# Patient Record
Sex: MALE | Race: BLACK OR AFRICAN AMERICAN | NOT HISPANIC OR LATINO | Employment: FULL TIME | URBAN - METROPOLITAN AREA
[De-identification: names, ages, dates, MRNs, and addresses within clinical notes are randomized per-mention and may not be internally consistent; named-entity substitution may affect disease eponyms.]

---

## 2017-07-11 ENCOUNTER — OFFICE VISIT (OUTPATIENT)
Dept: FAMILY MEDICINE | Facility: CLINIC | Age: 37
End: 2017-07-11
Payer: COMMERCIAL

## 2017-07-11 VITALS
WEIGHT: 179 LBS | SYSTOLIC BLOOD PRESSURE: 142 MMHG | BODY MASS INDEX: 28.09 KG/M2 | HEIGHT: 67 IN | HEART RATE: 68 BPM | TEMPERATURE: 98 F | DIASTOLIC BLOOD PRESSURE: 98 MMHG

## 2017-07-11 DIAGNOSIS — I10 BENIGN ESSENTIAL HYPERTENSION: Primary | ICD-10-CM

## 2017-07-11 DIAGNOSIS — R51.9 NONINTRACTABLE EPISODIC HEADACHE, UNSPECIFIED HEADACHE TYPE: ICD-10-CM

## 2017-07-11 PROCEDURE — 99214 OFFICE O/P EST MOD 30 MIN: CPT | Performed by: PHYSICIAN ASSISTANT

## 2017-07-11 RX ORDER — AMLODIPINE BESYLATE 5 MG/1
5 TABLET ORAL DAILY
Qty: 30 TABLET | Refills: 1 | Status: SHIPPED | OUTPATIENT
Start: 2017-07-11 | End: 2017-11-17

## 2017-07-11 ASSESSMENT — ENCOUNTER SYMPTOMS
SHORTNESS OF BREATH: 0
NAUSEA: 0
ABDOMINAL PAIN: 0
FOCAL WEAKNESS: 0
DIARRHEA: 0
FEVER: 0
VOMITING: 0
HEADACHES: 1
CHILLS: 0

## 2017-07-11 NOTE — Clinical Note
He is a candidate for PGen study. We should be offering this to all new diagnosis of hypertension (they don't get meds at the initial visit).  Do you have information on this? Darcie

## 2017-07-11 NOTE — MR AVS SNAPSHOT
After Visit Summary   7/11/2017    Ephraim Rebolledo    MRN: [id number]           Patient Information     Date Of Birth          1980        Visit Information        Provider Department      7/11/2017 11:40 AM Fanny Roberto PA-C [facility] [place]        Today's Diagnoses     Benign essential hypertension    -  1      Care Instructions    [facility]   Discharged by : Fidelia KUMARI MA    If you have any questions regarding your visit please contact your care team:     [facility] Clinic Hours Telephone Number   [address] Dr., Dr.   7am-7pm Monday - Thursday   7am-5pm Fridays  [phone]   (Appointment scheduling available 24/7)   RN Line   [phone] option 2       For a Price Quote for your services, please call our Consumer Price Line at [phone].     What options do I have for visits at the clinic other than the traditional office visit?     To expand how we care for you, many of our providers are utilizing electronic visits (e-visits) and telephone visits, when medically appropriate, for interactions with their patients rather than a visit in the clinic. We also offer nurse visits for many medical concerns. Just like any other service, we will bill your insurance company for this type of visit based on time spent on the phone with your provider. Not all insurance companies cover these visits. Please check with your medical insurance if this type of visit is covered. You will be responsible for any charges that are not paid by your insurance.   E-visits via Open-Plug: generally incur a $35.00 fee.     Telephone visits:   Time spent on the phone: *charged based on time that is spent on the phone in increments of 10 minutes. Estimated cost:   5-10 mins $30.00   11-20 mins. $59.00   21-30 mins. $85.00     Use Open-Plug (secure email communication and access to your chart) to  send your primary care provider a message or make an appointment. Ask someone on your Team how to sign up for PhoneGuard.     As always, Thank you for trusting us with your health care needs!      [facility]:    Scheduling Appointments  [place]  Call: [phone]    [place]  Call: [phone]    [facility]  Call: [phone]      WHERE TO GO FOR CARE?    Clinic    Make an appointment if you:       Are sick (cold, cough, flu, sore throat, earache or in pain).       Have a small injury (sprain, small cut, burn or broken bone).       Need a physical exam, Pap smear, vaccine or prescription refill.       Have questions about your health or medicines.    To reach us:      Call [phone] ([phone]). Open 24 hours every day. (For counseling services, call [phone].)    Log into PhoneGuard at [email or web address]. (Visit [email or web address] to create an account.) Hospital emergency room    An emergency is a serious or life- threatening problem that must be treated right away.    Call [phone] or get to the hospital if you have:      Very bad or sudden:            - Chest pain or pressure         - Bleeding         - Head or belly pain         - Dizziness or trouble seeing, walking or                          Speaking      Problems breathing      Blood in your vomit or you are coughing up blood      A major injury (knocked out, loss of a finger or limb, rape, broken bone protruding from skin)    A mental health crisis. (Or call the Mental Health Crisis line at 1-856.503.1402 or Suicide Prevention Hotline at 1-486.746.1842.)    Open 24 hours every day. You don't need an appointment.     Urgent care    Visit urgent care for sickness or small injuries when the clinic is closed. You don't need an appointment. To check hours or find an urgent care near you, visit [email or web address]. Online care    Get online care from  "[facility] for more than 70 common problems, like colds, allergies and infections. Open 24 hours every day at:   [email or web address]   Need help deciding?    For advice about where to be seen, you may call your clinic and ask to speak with a nurse. We're here for you 24 hours every day.         If you are deaf or hard of hearing, please let us know. We provide many free services including sign language interpreters, oral interpreters, TTYs, telephone amplifiers, note takers and written materials.                         Follow-ups after your visit        Who to contact     If you have questions or need follow up information about today's clinic visit or your schedule please contact [facility] directly at [phone].  Normal or non-critical lab and imaging results will be communicated to you by Miso Mediahart, letter or phone within 4 business days after the clinic has received the results. If you do not hear from us within 7 days, please contact the clinic through Miso Mediahart or phone. If you have a critical or abnormal lab result, we will notify you by phone as soon as possible.  Submit refill requests through Continuity Software or call your pharmacy and they will forward the refill request to us. Please allow 3 business days for your refill to be completed.          Additional Information About Your Visit        Continuity Software Information     Continuity Software lets you send messages to your doctor, view your test results, renew your prescriptions, schedule appointments and more. To sign up, go to [email or web address] . Click on \"Log in\" on the left side of the screen, which will take you to the Welcome page. Then click on \"Sign up Now\" on the right side of the page.     You will be asked to enter the access code listed below, as well as some personal information. Please follow the directions to create your username and password.     Your access code is: [email or web address]  Expires: 10/9/2017 11:59 AM     Your access code will  in 90 " "days. If you need help or a new code, please call your [facility] clinic or [phone].        Care EveryWhere ID     This is your Care EveryWhere ID. This could be used by other organizations to access your [facility] medical records  [id number]        Your Vitals Were     Pulse Temperature Height BMI (Body Mass Index)          68 98  F (36.7  C) (Oral) 5' 7\" (1.702 m) 28.04 kg/m2         Blood Pressure from Last 3 Encounters:   07/11/17 (!) 142/98   11/14/14 (!) 140/95   09/19/14 (!) 143/94    Weight from Last 3 Encounters:   07/11/17 179 lb (81.2 kg)   11/14/14 176 lb (79.8 kg)   09/19/14 172 lb (78 kg)              Today, you had the following     No orders found for display         Today's Medication Changes          These changes are accurate as of: 7/11/17 12:00 PM.  If you have any questions, ask your nurse or doctor.               Start taking these medicines.        Dose/Directions    amLODIPine 5 MG tablet   Commonly known as:  NORVASC   Used for:  Benign essential hypertension   Started by:  Fanny Roberto PA-C        Dose:  5 mg   Take 1 tablet (5 mg) by mouth daily   Quantity:  30 tablet   Refills:  1         Stop taking these medicines if you haven't already. Please contact your care team if you have questions.     isometheptene-dichloralphenazone-acetaminophen -325 MG per capsule   Commonly known as:  MIDRIN   Stopped by:  Fanny Roberto PA-C                Where to get your medicines      These medications were sent to [facility] [address]  [address] [facility], [facility] [address]     Phone:  [phone]     amLODIPine 5 MG tablet                Primary Care Provider Office Phone #    [facility] [phone]       No address on file        Equal Access to Services     ANNABELLA BURNHAM AH: Nikki manno Soomaali, waaxda luqadaha, qaybta kaalmada adeegyatamera, elpidio sky. " So [facility] [phone].    ATENCIÓN: Si habla charito, tiene a medina disposición servicios gratuitos de asistencia lingüística. Randall al [phone].    We comply with applicable federal civil rights laws and [place] laws. We do not discriminate on the basis of race, color, national origin, age, disability sex, sexual orientation or gender identity.            Thank you!     Thank you for choosing [facility]  for your care. Our goal is always to provide you with excellent care. Hearing back from our patients is one way we can continue to improve our services. Please take a few minutes to complete the written survey that you may receive in the mail after your visit with us. Thank you!             Your Updated Medication List - Protect others around you: Learn how to safely use, store and throw away your medicines at www.disposemymeds.org.          This list is accurate as of: 7/11/17 12:00 PM.  Always use your most recent med list.                   Brand Name Dispense Instructions for use Diagnosis    amLODIPine 5 MG tablet    NORVASC    30 tablet    Take 1 tablet (5 mg) by mouth daily    Benign essential hypertension       VITAMIN C PO

## 2017-07-11 NOTE — PROGRESS NOTES
HPI      SUBJECTIVE:                                                    Ephraim Rebolledo is a 36 year old male who presents to clinic today for the following health issues:    Over 2 years ago pt was diagnosed with hypertension. He chose to try lifestyle changes and was told to f/u in 1 month for recheck which he never did. Pt has been checking his BP at home and it has remained high. Most recently he checked it about 1 month ago and it was 141/91. Pt has been trying to reduce sodium and junk food. Has also been running on a treadmill once per week. Denies CP, SOB, leg swelling, or vision changes. No cardiac history in his family. No hx tobacco use.    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    He reports intermittent L sided HA which is described a throbbing for past few years as well. 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 It is improved by sleeping. Also notes photophobia at times. Denies vision changes, neck pain, focal weakness, dizziness, numbness/tingling, or any other symptoms.      Chart Review:  History   Smoking Status     Never Smoker   Smokeless Tobacco     Never Used     No flowsheet data found.  No flowsheet data found.    Patient Active Problem List   Diagnosis     CARDIOVASCULAR SCREENING; LDL GOAL LESS THAN 160     Vitamin D deficiency     Thrombophlebitis of arm, right     HTN, goal below 140/90     Overweight (BMI 25.0-29.9)     Past Surgical History:   Procedure Laterality Date     NO HISTORY OF SURGERY       Problem list, Medication list, Allergies, Medical/Social/Surg hx reviewed in [facility], updated as appropriate.      Review of Systems   Constitutional: Negative for chills and fever.        High BP   Respiratory: Negative for shortness of breath.    Cardiovascular: Negative for chest pain.   Gastrointestinal: Negative for abdominal pain, diarrhea, nausea and vomiting.   Skin: Negative for rash.   Neurological:  "Positive for headaches. Negative for focal weakness.   All other systems reviewed and are negative.        Physical Exam   Constitutional: He is oriented to person, place, and time and well-developed, well-nourished, and in no distress.   HENT:   Head: Normocephalic and atraumatic.   Eyes: EOM are normal. Pupils are equal, round, and reactive to light.   Cardiovascular: Normal rate, regular rhythm and normal heart sounds.    Pulmonary/Chest: Effort normal and breath sounds normal.   Musculoskeletal: Normal range of motion.   Neurological: He is alert and oriented to person, place, and time. He has normal motor skills, normal sensation and normal strength. He has a normal Cerebellar Exam. He shows no pronator drift. Gait normal.   Skin: Skin is warm and dry.   Nursing note and vitals reviewed.      Vital Signs  BP (!) 142/98  Pulse 68  Temp 98  F (36.7  C) (Oral)  Ht 5' 7\" (1.702 m)  Wt 179 lb (81.2 kg)  BMI 28.04 kg/m2   Body mass index is 28.04 kg/(m^2).    Diagnostic Test Results:  none     ASSESSMENT/PLAN:                                                        ICD-10-CM    1. Benign essential hypertension I10 amLODIPine (NORVASC) 5 MG tablet   2. Nonintractable episodic headache, unspecified headache type R51      /98 today. Pt agrees to start medication. Will start on amlodipine in this  patient. F/u in 2 weeks for nurse appt for BP recheck and to monitor HR. Make appt for physical.    Unremarkable neuro exam. HA may be related to hypertension. If HA persists once BP well controlled, consider neurology referral. May be migraine HAs.    I have discussed any lab or imaging results, the patient's diagnosis, and my plan of treatment with the patient and/or family. Patient is aware to come back in if with worsening symptoms or if no relief despite treatment plan.  Patient voiced understanding and had no further questions.       Follow Up: Return in about 2 weeks (around 7/25/2017) for " Routine Visit, BP Recheck.    DOMINIQUE Lee, PA-C  [facility]

## 2017-07-11 NOTE — PATIENT INSTRUCTIONS
[facility]   Discharged by : Fidelia KUMARI MA    If you have any questions regarding your visit please contact your care team:     [facility] Clinic Hours Telephone Number   [address] Dr., Dr.   7am-7pm Monday - Thursday   7am-5pm Fridays  [phone]   (Appointment scheduling available 24/7)   RN Line   [phone] option 2       For a Price Quote for your services, please call our Consumer Price Line at [phone].     What options do I have for visits at the clinic other than the traditional office visit?     To expand how we care for you, many of our providers are utilizing electronic visits (e-visits) and telephone visits, when medically appropriate, for interactions with their patients rather than a visit in the clinic. We also offer nurse visits for many medical concerns. Just like any other service, we will bill your insurance company for this type of visit based on time spent on the phone with your provider. Not all insurance companies cover these visits. Please check with your medical insurance if this type of visit is covered. You will be responsible for any charges that are not paid by your insurance.   E-visits via NoWait: generally incur a $35.00 fee.     Telephone visits:   Time spent on the phone: *charged based on time that is spent on the phone in increments of 10 minutes. Estimated cost:   5-10 mins $30.00   11-20 mins. $59.00   21-30 mins. $85.00     Use Agile Healthhart (secure email communication and access to your chart) to send your primary care provider a message or make an appointment. Ask someone on your Team how to sign up for NoWait.     As always, Thank you for trusting us with your health care needs!      [facility]:    Scheduling Appointments  [facility] [place]  Call: [phone]    [facility]  Call: [phone]    [facility]  [facility]  Call: [phone]      WHERE TO GO FOR CARE?    Clinic    Make an appointment if you:       Are sick (cold, cough, flu, sore throat, earache or in pain).       Have a small injury (sprain, small cut, burn or broken bone).       Need a physical exam, Pap smear, vaccine or prescription refill.       Have questions about your health or medicines.    To reach us:      Call [phone] ([phone]). Open 24 hours every day. (For counseling services, call [phone].)    Log into [employer]iences at Loggly. (Visit Vuzit to create an account.) Hospital emergency room    An emergency is a serious or life- threatening problem that must be treated right away.    Call [phone] or get to the hospital if you have:      Very bad or sudden:            - Chest pain or pressure         - Bleeding         - Head or belly pain         - Dizziness or trouble seeing, walking or                          Speaking      Problems breathing      Blood in your vomit or you are coughing up blood      A major injury (knocked out, loss of a finger or limb, rape, broken bone protruding from skin)    A mental health crisis. (Or call the Mental Health Crisis line at 1-316.683.4927 or Suicide Prevention Hotline at 1-922.242.4644.)    Open 24 hours every day. You don't need an appointment.     Urgent care    Visit urgent care for sickness or small injuries when the clinic is closed. You don't need an appointment. To check hours or find an urgent care near you, visit [email or web address]. Online care    Get online care from [facility] for more than 70 common problems, like colds, allergies and infections. Open 24 hours every day at:   [email or web address]   Need help deciding?    For advice about where to be seen, you may call your clinic and ask to speak with a nurse. We're here for you 24 hours every day.         If you are deaf or hard of hearing, please let us know. We provide many free services including  sign language interpreters, oral interpreters, TTYs, telephone amplifiers, note takers and written materials.

## 2017-07-26 NOTE — PROGRESS NOTES
Chart reviewed.  Encounter was reviewed with provider.  Patient was not examined by me.  Jazmine Pham MD

## 2017-09-06 ENCOUNTER — TELEPHONE (OUTPATIENT)
Dept: FAMILY MEDICINE | Facility: CLINIC | Age: 37
End: 2017-09-06

## 2017-09-06 NOTE — TELEPHONE ENCOUNTER
Panel Management Review      BP Readings from Last 1 Encounters:   07/11/17 (!) 142/98        Fail List measure: BLOOD PRESURE      Patient is due/failing the following:   BP CHECK    Action needed:   Patient needs office visit for blood pressure.    Type of outreach:    Phone, spoke to patient.  He states that the provider told him to check it and he did at the pharmacy and it is fine. He will calll back to make his physical.    Questions for provider review:    None                                                                                                                                    Kevin Reina      Chart routed to Care Team .

## 2017-11-17 DIAGNOSIS — I10 BENIGN ESSENTIAL HYPERTENSION: ICD-10-CM

## 2017-11-17 RX ORDER — AMLODIPINE BESYLATE 5 MG/1
5 TABLET ORAL DAILY
Qty: 90 TABLET | Refills: 0 | Status: SHIPPED | OUTPATIENT
Start: 2017-11-17 | End: 2018-08-30

## 2017-11-17 NOTE — TELEPHONE ENCOUNTER
amLODIPine (NORVASC) 5 MG tablet      Last Written Prescription Date:  7/11/17  Last Fill Quantity: 30,   # refills: 1  Future Office visit:       Potassium   Date Value Ref Range Status   09/16/2014 3.8 3.4 - 5.3 mmol/L Final     Creatinine   Date Value Ref Range Status   09/16/2014 1.10 0.66 - 1.25 mg/dL Final     BP Readings from Last 3 Encounters:   07/11/17 (!) 142/98   11/14/14 (!) 140/95   09/19/14 (!) 143/94     PCP aware of BP's.     Thanks! Isabel Seay RN

## 2017-11-17 NOTE — TELEPHONE ENCOUNTER
amLODIPine (NORVASC) 5 MG tablet      Last Written Prescription Date:  7/11/17  Last Fill Quantity: 30,   # refills: 1  Future Office visit:       Routing refill request to provider for review/approval because:  Does not meet protocol criteria  LOV: 7/11/17

## 2018-08-30 ENCOUNTER — OFFICE VISIT (OUTPATIENT)
Dept: FAMILY MEDICINE | Facility: CLINIC | Age: 38
End: 2018-08-30
Payer: COMMERCIAL

## 2018-08-30 VITALS
SYSTOLIC BLOOD PRESSURE: 148 MMHG | DIASTOLIC BLOOD PRESSURE: 90 MMHG | HEART RATE: 71 BPM | RESPIRATION RATE: 20 BRPM | WEIGHT: 184 LBS | TEMPERATURE: 97.9 F | OXYGEN SATURATION: 98 % | HEIGHT: 67 IN | BODY MASS INDEX: 28.88 KG/M2

## 2018-08-30 DIAGNOSIS — I10 BENIGN ESSENTIAL HYPERTENSION: ICD-10-CM

## 2018-08-30 DIAGNOSIS — Z23 NEED FOR IMMUNIZATION AGAINST INFLUENZA: ICD-10-CM

## 2018-08-30 DIAGNOSIS — Z00.01 ENCOUNTER FOR ROUTINE ADULT HEALTH EXAMINATION WITH ABNORMAL FINDINGS: Primary | ICD-10-CM

## 2018-08-30 DIAGNOSIS — Z23 NEED FOR PROPHYLACTIC VACCINATION AND INOCULATION AGAINST INFLUENZA: ICD-10-CM

## 2018-08-30 DIAGNOSIS — Z23 NEED FOR DIPHTHERIA-TETANUS-PERTUSSIS (TDAP) VACCINE, ADULT/ADOLESCENT: ICD-10-CM

## 2018-08-30 LAB
ALBUMIN SERPL-MCNC: 3.6 G/DL (ref 3.4–5)
ALP SERPL-CCNC: 79 U/L (ref 40–150)
ALT SERPL W P-5'-P-CCNC: 28 U/L (ref 0–70)
ANION GAP SERPL CALCULATED.3IONS-SCNC: 6 MMOL/L (ref 3–14)
AST SERPL W P-5'-P-CCNC: 29 U/L (ref 0–45)
BILIRUB SERPL-MCNC: 0.3 MG/DL (ref 0.2–1.3)
BUN SERPL-MCNC: 11 MG/DL (ref 7–30)
CALCIUM SERPL-MCNC: 8.8 MG/DL (ref 8.5–10.1)
CHLORIDE SERPL-SCNC: 104 MMOL/L (ref 94–109)
CHOLEST SERPL-MCNC: 137 MG/DL
CO2 SERPL-SCNC: 31 MMOL/L (ref 20–32)
CREAT SERPL-MCNC: 1.08 MG/DL (ref 0.66–1.25)
CREAT UR-MCNC: 374 MG/DL
GFR SERPL CREATININE-BSD FRML MDRD: 76 ML/MIN/1.7M2
GLUCOSE SERPL-MCNC: 82 MG/DL (ref 70–99)
HBA1C MFR BLD: 5.6 % (ref 0–5.6)
HDLC SERPL-MCNC: 54 MG/DL
LDLC SERPL CALC-MCNC: 70 MG/DL
MICROALBUMIN UR-MCNC: 14 MG/L
MICROALBUMIN/CREAT UR: 3.8 MG/G CR (ref 0–17)
NONHDLC SERPL-MCNC: 83 MG/DL
POTASSIUM SERPL-SCNC: 3.5 MMOL/L (ref 3.4–5.3)
PROT SERPL-MCNC: 7.3 G/DL (ref 6.8–8.8)
SODIUM SERPL-SCNC: 141 MMOL/L (ref 133–144)
TRIGL SERPL-MCNC: 66 MG/DL
TSH SERPL DL<=0.005 MIU/L-ACNC: 0.61 MU/L (ref 0.4–4)

## 2018-08-30 PROCEDURE — 80061 LIPID PANEL: CPT | Performed by: NURSE PRACTITIONER

## 2018-08-30 PROCEDURE — 90715 TDAP VACCINE 7 YRS/> IM: CPT | Performed by: NURSE PRACTITIONER

## 2018-08-30 PROCEDURE — 83036 HEMOGLOBIN GLYCOSYLATED A1C: CPT | Performed by: NURSE PRACTITIONER

## 2018-08-30 PROCEDURE — 90686 IIV4 VACC NO PRSV 0.5 ML IM: CPT | Performed by: NURSE PRACTITIONER

## 2018-08-30 PROCEDURE — 99395 PREV VISIT EST AGE 18-39: CPT | Mod: 25 | Performed by: NURSE PRACTITIONER

## 2018-08-30 PROCEDURE — 90471 IMMUNIZATION ADMIN: CPT | Performed by: NURSE PRACTITIONER

## 2018-08-30 PROCEDURE — 36415 COLL VENOUS BLD VENIPUNCTURE: CPT | Performed by: NURSE PRACTITIONER

## 2018-08-30 PROCEDURE — 82043 UR ALBUMIN QUANTITATIVE: CPT | Performed by: NURSE PRACTITIONER

## 2018-08-30 PROCEDURE — 99213 OFFICE O/P EST LOW 20 MIN: CPT | Mod: 25 | Performed by: NURSE PRACTITIONER

## 2018-08-30 PROCEDURE — 84443 ASSAY THYROID STIM HORMONE: CPT | Performed by: NURSE PRACTITIONER

## 2018-08-30 PROCEDURE — 80053 COMPREHEN METABOLIC PANEL: CPT | Performed by: NURSE PRACTITIONER

## 2018-08-30 PROCEDURE — 90472 IMMUNIZATION ADMIN EACH ADD: CPT | Performed by: NURSE PRACTITIONER

## 2018-08-30 RX ORDER — AMLODIPINE BESYLATE 5 MG/1
5 TABLET ORAL DAILY
Qty: 90 TABLET | Refills: 3 | Status: SHIPPED | OUTPATIENT
Start: 2018-08-30 | End: 2019-02-26

## 2018-08-30 ASSESSMENT — PAIN SCALES - GENERAL: PAINLEVEL: NO PAIN (0)

## 2018-08-30 NOTE — PROGRESS NOTES
SUBJECTIVE:   CC: Ephraim Rebolledo is an 37 year old male who presents for preventative health visit.     Healthy Habits:    Do you get at least three servings of calcium containing foods daily (dairy, green leafy vegetables, etc.)? yes    Amount of exercise or daily activities, outside of work: 3-4 day(s) per week    Problems taking medications regularly No    Medication side effects: No    Have you had an eye exam in the past two years? yes    Do you see a dentist twice per year? no    Do you have sleep apnea, excessive snoring or daytime drowsiness?no       Hypertension:  Again stopped amlodipine and tried lifestyle changes- exercise and diet.  But would like to restart Amlodipine now.    Today's PHQ-2 Score:   PHQ-2 ( 1999 Pfizer) 8/30/2018 9/16/2014   Q1: Little interest or pleasure in doing things 0 0   Q2: Feeling down, depressed or hopeless 0 0   PHQ-2 Score 0 0       Abuse: Current or Past(Physical, Sexual or Emotional)- No  Do you feel safe in your environment - Yes    Social History   Substance Use Topics     Smoking status: Never Smoker     Smokeless tobacco: Never Used     Alcohol use Yes      Comment: occasionally, couple times a year      If you drink alcohol do you typically have >3 drinks per day or >7 drinks per week? Not Applicable                      Last PSA: No results found for: PSA    Reviewed orders with patient. Reviewed health maintenance and updated orders accordingly - Yes  BP Readings from Last 3 Encounters:   08/30/18 148/90   07/11/17 (!) 142/98   11/14/14 (!) 140/95    Wt Readings from Last 3 Encounters:   08/30/18 184 lb (83.5 kg)   07/11/17 179 lb (81.2 kg)   11/14/14 176 lb (79.8 kg)                  Patient Active Problem List   Diagnosis     CARDIOVASCULAR SCREENING; LDL GOAL LESS THAN 160     Vitamin D deficiency     Thrombophlebitis of arm, right     HTN, goal below 140/90     Overweight (BMI 25.0-29.9)     Past Surgical History:   Procedure Laterality Date     NO HISTORY  OF SURGERY         Social History   Substance Use Topics     Smoking status: Never Smoker     Smokeless tobacco: Never Used     Alcohol use Yes      Comment: occasionally, couple times a year     Family History   Problem Relation Age of Onset     Hypertension Mother      HEART DISEASE Brother      cardiomegaly         Current Outpatient Prescriptions   Medication Sig Dispense Refill     amLODIPine (NORVASC) 5 MG tablet Take 1 tablet (5 mg) by mouth daily 90 tablet 3     Ascorbic Acid (VITAMIN C PO)        [DISCONTINUED] amLODIPine (NORVASC) 5 MG tablet Take 1 tablet (5 mg) by mouth daily (Patient not taking: Reported on 8/30/2018) 90 tablet 0     No Known Allergies  Recent Labs   Lab Test  09/16/14   1636  01/09/12   1135   LDL   --   94   HDL   --   55   TRIG   --   62   ALT  24   --    CR  1.10  0.89   GFRESTIMATED  76  >90   GFRESTBLACK  >90   GFR Calc    >90   POTASSIUM  3.8  3.8        Reviewed and updated as needed this visit by clinical staff  Tobacco  Allergies  Meds  Med Hx  Surg Hx  Fam Hx  Soc Hx        Reviewed and updated as needed this visit by Provider  Tobacco  Allergies  Meds  Med Hx  Surg Hx  Fam Hx  Soc Hx       Past Medical History:   Diagnosis Date     Measles as Child      Past Surgical History:   Procedure Laterality Date     NO HISTORY OF SURGERY         ROS:  CONSTITUTIONAL: NEGATIVE for fever, chills, change in weight  INTEGUMENTARY/SKIN: NEGATIVE for worrisome rashes, moles or lesions  EYES: NEGATIVE for vision changes or irritation  ENT: NEGATIVE for ear, mouth and throat problems  RESP: NEGATIVE for significant cough or SOB  CV: NEGATIVE for chest pain, palpitations or peripheral edema  GI: NEGATIVE for nausea, abdominal pain, heartburn, or change in bowel habits   male: negative for dysuria, hematuria, decreased urinary stream, erectile dysfunction, urethral discharge  MUSCULOSKELETAL: NEGATIVE for significant arthralgias or myalgia  NEURO: NEGATIVE for  "weakness, dizziness or paresthesias  PSYCHIATRIC: NEGATIVE for changes in mood or affect    OBJECTIVE:   /90 (BP Location: Right arm, Patient Position: Chair, Cuff Size: Adult Regular)  Pulse 71  Temp 97.9  F (36.6  C) (Oral)  Resp 20  Ht 5' 6.5\" (1.689 m)  Wt 184 lb (83.5 kg)  SpO2 98%  BMI 29.25 kg/m2  EXAM:  GENERAL: healthy, alert and no distress  EYES: Eyes grossly normal to inspection, PERRL and conjunctivae and sclerae normal  HENT: ear canals and TM's normal, nose and mouth without ulcers or lesions  NECK: no adenopathy, no asymmetry, masses, or scars and thyroid normal to palpation  RESP: lungs clear to auscultation - no rales, rhonchi or wheezes  CV: regular rate and rhythm, normal S1 S2, no S3 or S4, no murmur, click or rub, no peripheral edema and peripheral pulses strong  ABDOMEN: soft, nontender, no hepatosplenomegaly, no masses and bowel sounds normal  MS: no gross musculoskeletal defects noted, no edema  SKIN: no suspicious lesions or rashes  NEURO: Normal strength and tone, mentation intact and speech normal  PSYCH: mentation appears normal, affect normal/bright    Diagnostic Test Results:  No results found for this or any previous visit (from the past 24 hour(s)).    ASSESSMENT/PLAN:   1. Encounter for routine adult health examination with abnormal findings  States with employee health at the [facility] he said he has had immunity testing for Hep B, varicella, and MMR.  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  - Comprehensive metabolic panel  - Hemoglobin A1c    2. Benign essential hypertension  Restarting medication today.    - Comprehensive metabolic panel  - Albumin Random Urine Quantitative with Creat Ratio  - Hemoglobin A1c  - amLODIPine (NORVASC) 5 MG tablet; Take 1 tablet (5 mg) by mouth daily  Dispense: 90 tablet; Refill: 3  - TSH with free T4 reflex  - Lipid panel reflex to direct LDL " "Non-fasting    3. Need for diphtheria-tetanus-pertussis (Tdap) vaccine, adult/adolescent  - TDAP, IM (10 - 64 YRS) - Adacel  - ADMIN 1st VACCINE    4. Need for immunization against influenza  - VACCINE ADMINISTRATION, EACH ADDITIONAL  - FLU VACCINE, SPLIT VIRUS, IM (QUADRIVALENT) [75154]- >3 YRS    COUNSELING:  Reviewed preventive health counseling, as reflected in patient instructions       Regular exercise       Healthy diet/nutrition       Vision screening       Immunizations    Vaccinated against: Influenza and TDAP         BP Readings from Last 1 Encounters:   08/30/18 148/90     Estimated body mass index is 29.25 kg/(m^2) as calculated from the following:    Height as of this encounter: 5' 6.5\" (1.689 m).    Weight as of this encounter: 184 lb (83.5 kg).      Weight management plan: Discussed healthy diet and exercise guidelines and patient will follow up in 6 months in clinic to re-evaluate.     reports that he has never smoked. He has never used smokeless tobacco.      Counseling Resources:  ATP IV Guidelines  Pooled Cohorts Equation Calculator  FRAX Risk Assessment  ICSI Preventive Guidelines  Dietary Guidelines for Americans, 2010  USDA's MyPlate  ASA Prophylaxis  Lung CA Screening    [facility]    Injectable Influenza Immunization Documentation    1.  Is the person to be vaccinated sick today?   No    2. Does the person to be vaccinated have an allergy to a component   of the vaccine?   No  Egg Allergy Algorithm Link    3. Has the person to be vaccinated ever had a serious reaction   to influenza vaccine in the past?   No    4. Has the person to be vaccinated ever had Guillain-Barré syndrome?   No    Form completed by Haley Scott MA 8/30/2018           "

## 2018-08-30 NOTE — MR AVS SNAPSHOT
After Visit Summary   8/30/2018    Ephraim Rebolledo    MRN: [id number]           Patient Information     Date Of Birth          1980        Visit Information        Provider Department      8/30/2018 1:20 PM Carmela Narayanan APRN CNP [facility]        Today's Diagnoses     Need for diphtheria-tetanus-pertussis (Tdap) vaccine, adult/adolescent    -  1    Benign essential hypertension        Need for immunization against influenza        Encounter for routine adult health examination with abnormal findings        Need for prophylactic vaccination and inoculation against influenza          Care Instructions      Preventive Health Recommendations  Male Ages 26 - 39    Yearly exam:             See your health care provider every year in order to  o   Review health changes.   o   Discuss preventive care.    o   Review your medicines if your doctor has prescribed any.    You should be tested each year for STDs (sexually transmitted diseases), if you re at risk.     After age 35, talk to your provider about cholesterol testing. If you are at risk for heart disease, have your cholesterol tested at least every 5 years.     If you are at risk for diabetes, you should have a diabetes test (fasting glucose).  Shots: Get a flu shot each year. Get a tetanus shot every 10 years.     Nutrition:    Eat at least 5 servings of fruits and vegetables daily.     Eat whole-grain bread, whole-wheat pasta and brown rice instead of white grains and rice.     Get adequate Calcium and Vitamin D.     Lifestyle    Exercise for at least 150 minutes a week (30 minutes a day, 5 days a week). This will help you control your weight and prevent disease.     Limit alcohol to one drink per day.     No smoking.     Wear sunscreen to prevent skin cancer.     See your dentist every six months for an exam and cleaning.     At [facility], we strive to deliver an exceptional experience to  you, every time we see you.  If you receive a survey in the mail, please send us back your thoughts. We really do value your feedback.    Based on your medical history, these are the current health maintenance/preventive care services that you are due for (some may have been done at this visit.)  Health Maintenance Due   Topic Date Due     PHQ-2 Q1 YR  11/25/1990     LIPID SCREEN Q5 YR MALE (SYSTEM ASSIGNED)  01/09/2017       Suggested websites for health information:  Www.CareParent.org : Up to date and easily searchable information on multiple topics.  Www.medlineplus.gov : medication info, interactive tutorials, watch real surgeries online  Www.familydoctor.org : good info from the Academy of Family Physicians  Www.cdc.gov : public health info, travel advisories, epidemics (H1N1)  Www.aap.org : children's health info, normal development, vaccinations  Www.health.[facility].[place].us : [place] dept of health, public health issues in [place], N1N1    Your care team:                            Family Medicine Internal Medicine   MD Clint Faith MD Shantel Branch-Fleming, MD Katya Georgiev PA-C Megan Hill, APRN CNP    Eric Lui MD Pediatrics   Denis Mace, PATalatC  Carmela Narayanan, CNP MD Abigail Nieves APRN CNP   MD Sia Tobar MD Deborah Mielke, MD Kim Thein, APRN CNP      Clinic hours: Monday - Thursday 7 am-7 pm; Fridays 7 am-5 pm.   Urgent care: Monday - Friday 11 am-9 pm; Saturday and Sunday 9 am-5 pm.  Pharmacy : Monday -Thursday 8 am-8 pm; Friday 8 am-6 pm; Saturday and Sunday 9 am-5 pm.     Clinic: [phone]   Pharmacy: [phone]              Follow-ups after your visit        Who to contact     If you have questions or need follow up information about today's clinic visit or your schedule please contact [facility] [place] directly at [phone].  Normal or non-critical lab and imaging results will be communicated to you by MyChart, letter or phone  "within 4 business days after the clinic has received the results. If you do not hear from us within 7 days, please contact the clinic through [employer] or phone. If you have a critical or abnormal lab result, we will notify you by phone as soon as possible.  Submit refill requests through [employer] or call your pharmacy and they will forward the refill request to us. Please allow 3 business days for your refill to be completed.          Additional Information About Your Visit        CliqSearchharSNAPP' Information     [employer] gives you secure access to your electronic health record. If you see a primary care provider, you can also send messages to your care team and make appointments. If you have questions, please call your primary care clinic.  If you do not have a primary care provider, please call [phone] and they will assist you.        Care EveryWhere ID     This is your Care EveryWhere ID. This could be used by other organizations to access your [place] medical records  [id number]        Your Vitals Were     Pulse Temperature Respirations Height Pulse Oximetry BMI (Body Mass Index)    71 97.9  F (36.6  C) (Oral) 20 5' 6.5\" (1.689 m) 98% 29.25 kg/m2       Blood Pressure from Last 3 Encounters:   08/30/18 148/90   07/11/17 (!) 142/98   11/14/14 (!) 140/95    Weight from Last 3 Encounters:   08/30/18 184 lb (83.5 kg)   07/11/17 179 lb (81.2 kg)   11/14/14 176 lb (79.8 kg)              We Performed the Following     ADMIN 1st VACCINE     Albumin Random Urine Quantitative with Creat Ratio     Comprehensive metabolic panel     FLU VACCINE, SPLIT VIRUS, IM (QUADRIVALENT) [38087]- >3 YRS     Hemoglobin A1c     Lipid panel reflex to direct LDL Fasting     TDAP, IM (10 - 64 YRS) - Adacel     TSH with free T4 reflex     VACCINE ADMINISTRATION, EACH ADDITIONAL          Where to get your medicines      These medications were [place] [facility] [place] [place] [address]  [address], [place] " [facility] [address]     Phone:  [phone]     amLODIPine 5 MG tablet          Primary Care Provider Office Phone # Fax #    [facility] [phone]       [address]  [facility] [address]        Equal Access to Services     ANNABELLA BURNHAM : Hadii aad ku hadasho Soomaali, waaxda luqadaha, qaybta kaalmada adeegyada, waxay idiin hayaan adeeg kharash la'baldevn . So [facility] [place] [phone].    ATENCIÓN: Si habla español, tiene a medina disposición servicios gratuitos de asistencia lingüística. Llame al [phone].    We comply with applicable federal civil rights laws and [place] laws. We do not discriminate on the basis of race, color, national origin, age, disability, sex, sexual orientation, or gender identity.            Thank you!     Thank you for choosing [facility]  for your care. Our goal is always to provide you with excellent care. Hearing back from our patients is one way we can continue to improve our services. Please take a few minutes to complete the written survey that you may receive in the mail after your visit with us. Thank you!             Your Updated Medication List - Protect others around you: Learn how to safely use, store and throw away your medicines at www.disposemymeds.org.          This list is accurate as of 8/30/18  2:40 PM.  Always use your most recent med list.                   Brand Name Dispense Instructions for use Diagnosis    amLODIPine 5 MG tablet    NORVASC    90 tablet    Take 1 tablet (5 mg) by mouth daily    Benign essential hypertension       VITAMIN C PO

## 2018-08-30 NOTE — PATIENT INSTRUCTIONS
Preventive Health Recommendations  Male Ages 26 - 39    Yearly exam:             See your health care provider every year in order to  o   Review health changes.   o   Discuss preventive care.    o   Review your medicines if your doctor has prescribed any.    You should be tested each year for STDs (sexually transmitted diseases), if you re at risk.     After age 35, talk to your provider about cholesterol testing. If you are at risk for heart disease, have your cholesterol tested at least every 5 years.     If you are at risk for diabetes, you should have a diabetes test (fasting glucose).  Shots: Get a flu shot each year. Get a tetanus shot every 10 years.     Nutrition:    Eat at least 5 servings of fruits and vegetables daily.     Eat whole-grain bread, whole-wheat pasta and brown rice instead of white grains and rice.     Get adequate Calcium and Vitamin D.     Lifestyle    Exercise for at least 150 minutes a week (30 minutes a day, 5 days a week). This will help you control your weight and prevent disease.     Limit alcohol to one drink per day.     No smoking.     Wear sunscreen to prevent skin cancer.     See your dentist every six months for an exam and cleaning.     At [facility], we strive to deliver an exceptional experience to you, every time we see you.  If you receive a survey in the mail, please send us back your thoughts. We really do value your feedback.    Based on your medical history, these are the current health maintenance/preventive care services that you are due for (some may have been done at this visit.)  Health Maintenance Due   Topic Date Due     PHQ-2 Q1 YR  11/25/1990     LIPID SCREEN Q5 YR MALE (SYSTEM ASSIGNED)  01/09/2017       Suggested websites for health information:  Www.Remitly.org : Up to date and easily searchable information on multiple topics.  Www.medlineplus.gov : medication info, interactive tutorials, watch real surgeries  online  Www.familydoctor.org : good info from the Academy of Family Physicians  Www.cdc.gov : public health info, travel advisories, epidemics (H1N1)  Www.aap.org : children's health info, normal development, vaccinations  Www.health.state[place]us : [place] dept of health, public health issues in [place], N1N1    Your care team:                            Family Medicine Internal Medicine   MD Clint Faith MD Shantel Branch-Fleming, MD Katya Georgiev PA-C Megan Hill, APRPETEY Lui MD Pediatrics   Denis Mace, KELLY Narayanan, MD Abigail Fernandez APRN CNP   MD Sia Tobar MD Deborah Mielke, MD Kim Thein, APRN [facility]      Clinic hours: Monday - Thursday 7 am-7 pm; Fridays 7 am-5 pm.   Urgent care: Monday - Friday 11 am-9 pm; Saturday and Sunday 9 am-5 pm.  Pharmacy : Monday -Thursday 8 am-8 pm; Friday 8 am-6 pm; Saturday and Sunday 9 am-5 pm.     Clinic: [phone]   Pharmacy: [phone]

## 2018-08-30 NOTE — NURSING NOTE
Screening Questionnaire for Adult Immunization    Are you sick today?   No   Do you have allergies to medications, food, a vaccine component or latex?   No   Have you ever had a serious reaction after receiving a vaccination?   No   Do you have a long-term health problem with heart disease, lung disease, asthma, kidney disease, metabolic disease (e.g. diabetes), anemia, or other blood disorder?   No   Do you have cancer, leukemia, HIV/AIDS, or any other immune system problem?   No   In the past 3 months, have you taken medications that affect  your immune system, such as prednisone, other steroids, or anticancer drugs; drugs for the treatment of rheumatoid arthritis, Crohn s disease, or psoriasis; or have you had radiation treatments?   No   Have you had a seizure, or a brain or other nervous system problem?   No   During the past year, have you received a transfusion of blood or blood     products, or been given immune (gamma) globulin or antiviral drug?   No   For women: Are you pregnant or is there a chance you could become        pregnant during the next month?   No   Have you received any vaccinations in the past 4 weeks?   No     Immunization questionnaire answers were all negative.        Per orders of JOCELINE MOSQUERA, injection of Tdap, Flu Vacc given by Haley Scott.   Screening performed by Haley Scott on 8/30/2018 at 3:35 PM.

## 2019-02-26 ENCOUNTER — OFFICE VISIT (OUTPATIENT)
Dept: FAMILY MEDICINE | Facility: CLINIC | Age: 39
End: 2019-02-26
Payer: COMMERCIAL

## 2019-02-26 VITALS
BODY MASS INDEX: 29.13 KG/M2 | TEMPERATURE: 97.8 F | HEART RATE: 72 BPM | SYSTOLIC BLOOD PRESSURE: 136 MMHG | HEIGHT: 67 IN | WEIGHT: 185.6 LBS | DIASTOLIC BLOOD PRESSURE: 80 MMHG

## 2019-02-26 DIAGNOSIS — R41.840 INATTENTION: ICD-10-CM

## 2019-02-26 DIAGNOSIS — G47.00 INSOMNIA, UNSPECIFIED TYPE: Primary | ICD-10-CM

## 2019-02-26 DIAGNOSIS — I10 BENIGN ESSENTIAL HYPERTENSION: ICD-10-CM

## 2019-02-26 PROCEDURE — 99214 OFFICE O/P EST MOD 30 MIN: CPT | Performed by: FAMILY MEDICINE

## 2019-02-26 RX ORDER — TRAZODONE HYDROCHLORIDE 50 MG/1
50-150 TABLET, FILM COATED ORAL AT BEDTIME
Qty: 90 TABLET | Refills: 1 | Status: SHIPPED | OUTPATIENT
Start: 2019-02-26 | End: 2019-04-24

## 2019-02-26 RX ORDER — AMLODIPINE BESYLATE 5 MG/1
5 TABLET ORAL DAILY
Qty: 90 TABLET | Refills: 3 | Status: SHIPPED | OUTPATIENT
Start: 2019-02-26 | End: 2020-03-05

## 2019-02-26 ASSESSMENT — ANXIETY QUESTIONNAIRES
3. WORRYING TOO MUCH ABOUT DIFFERENT THINGS: NEARLY EVERY DAY
2. NOT BEING ABLE TO STOP OR CONTROL WORRYING: NEARLY EVERY DAY
5. BEING SO RESTLESS THAT IT IS HARD TO SIT STILL: NOT AT ALL
GAD7 TOTAL SCORE: 13
IF YOU CHECKED OFF ANY PROBLEMS ON THIS QUESTIONNAIRE, HOW DIFFICULT HAVE THESE PROBLEMS MADE IT FOR YOU TO DO YOUR WORK, TAKE CARE OF THINGS AT HOME, OR GET ALONG WITH OTHER PEOPLE: SOMEWHAT DIFFICULT
1. FEELING NERVOUS, ANXIOUS, OR ON EDGE: SEVERAL DAYS
6. BECOMING EASILY ANNOYED OR IRRITABLE: NOT AT ALL
7. FEELING AFRAID AS IF SOMETHING AWFUL MIGHT HAPPEN: NEARLY EVERY DAY

## 2019-02-26 ASSESSMENT — PATIENT HEALTH QUESTIONNAIRE - PHQ9
5. POOR APPETITE OR OVEREATING: NEARLY EVERY DAY
SUM OF ALL RESPONSES TO PHQ QUESTIONS 1-9: 10

## 2019-02-26 ASSESSMENT — MIFFLIN-ST. JEOR: SCORE: 1702.57

## 2019-02-26 NOTE — PROGRESS NOTES
"  SUBJECTIVE:   Ephraim Rebolledo is a 38 year old male who presents to clinic today for the following health issues:    1) Concerns for ADD/ ADHD-- lack of focus, cannot concentrate-- mind is always racing.  He feels that he might have depression.  Symptoms have worsened over the past 6 months.  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  He has noticed that his grades have been suffering.  He has trouble focusing when he is reading.  His mind drifts a lot.  He has never had issues like this before in the past.      2) Insomnia  Onset: \"Years\"    Description:   Time to fall asleep (sleep latency): \"forever\"  Middle of night awakening:  YES  Early morning awakening:  YES    Progression of Symptoms:  worsening    Accompanying Signs & Symptoms:  Daytime sleepiness/napping: YES  Excessive snoring/apnea: no   Restless legs: no   Frequent urination: no   Chronic pain:  no     History:  Prior Insomnia: no    Precipitating factors:   New stressful situation: YES  Caffeine intake: YES  OTC decongestants: no   Any new medications: YES-  OTC Vitamin B3    Alleviating factors:  Self medicating (alcohol, etc.):  No    Patient notes that when he goes to bed \"millions of things go through his mind\".  He only sleeps for 2-3 hours at night.      Therapies Tried and outcome:  None    Hypertension Follow-up    Outpatient blood pressures are being checked at home.  Results are 119/120 over 80's.    Low Salt Diet: no added salt      Problem list and histories reviewed & adjusted, as indicated.  Additional history: as documented    Patient Active Problem List   Diagnosis     CARDIOVASCULAR SCREENING; LDL GOAL LESS THAN 160     Vitamin D deficiency     Thrombophlebitis of arm, right     HTN, goal below 140/90     Overweight (BMI 25.0-29.9)     Past Surgical History:   Procedure Laterality Date     NO HISTORY OF SURGERY         Social History     Tobacco Use     Smoking status: Never Smoker     Smokeless " "tobacco: Never Used   Substance Use Topics     Alcohol use: Yes     Comment: occasionally, couple times a year     Family History   Problem Relation Age of Onset     Hypertension Mother      Heart Disease Brother         cardiomegaly           Reviewed and updated as needed this visit by clinical staff  Tobacco  Allergies  Meds  Med Hx  Surg Hx  Fam Hx  Soc Hx      Reviewed and updated as needed this visit by Provider         ROS:  Constitutional, HEENT, cardiovascular, pulmonary, gi and gu systems are negative, except as otherwise noted.    OBJECTIVE:     /80 (BP Location: Right arm, Patient Position: Sitting, Cuff Size: Adult Large)   Pulse 72   Temp 97.8  F (36.6  C) (Oral)   Ht 1.689 m (5' 6.5\")   Wt 84.2 kg (185 lb 9.6 oz)   BMI 29.51 kg/m    Body mass index is 29.51 kg/m .  GENERAL: healthy, alert and no distress  RESP: lungs clear to auscultation - no rales, rhonchi or wheezes  CV: regular rates and rhythm, normal S1 S2, no S3 or S4 and no murmur, click or rub  PSYCH: mentation appears normal and affect normal/bright    ASSESSMENT/PLAN:     1. Inattention  2. Insomnia, unspecified type  - I suspect that all of his symptoms are 2/2 insomnia vs depression/anxiety vs both   - Will start trazodone at bedtime to help with insomnia  - Offered a mental health referral for ADHD testing, but discussed that I doubt he has ADHD  - He will let me know if he'd like to proceed with the ADHD testing later on   - traZODone (DESYREL) 50 MG tablet; Take 1-3 tablets ( mg) by mouth At Bedtime  Dispense: 90 tablet; Refill: 1    3. Benign essential hypertension  - Well controlled   - amLODIPine (NORVASC) 5 MG tablet; Take 1 tablet (5 mg) by mouth daily  Dispense: 90 tablet; Refill: 3    Follow up in 1-2 months     Radha Daniels,   [facility]    "

## 2019-02-27 ASSESSMENT — ANXIETY QUESTIONNAIRES: GAD7 TOTAL SCORE: 13

## 2019-04-24 ENCOUNTER — HOSPITAL ENCOUNTER (EMERGENCY)
Facility: CLINIC | Age: 39
Discharge: HOME | End: 2019-04-24
Attending: EMERGENCY MEDICINE | Admitting: EMERGENCY MEDICINE
Payer: COMMERCIAL

## 2019-04-24 VITALS
SYSTOLIC BLOOD PRESSURE: 141 MMHG | TEMPERATURE: 98.7 F | HEART RATE: 68 BPM | WEIGHT: 182 LBS | HEIGHT: 67 IN | RESPIRATION RATE: 16 BRPM | OXYGEN SATURATION: 100 % | DIASTOLIC BLOOD PRESSURE: 103 MMHG | BODY MASS INDEX: 28.56 KG/M2

## 2019-04-24 DIAGNOSIS — K62.5 BRIGHT RED BLOOD PER RECTUM: ICD-10-CM

## 2019-04-24 DIAGNOSIS — K62.5 RECTAL BLEEDING: Primary | ICD-10-CM

## 2019-04-24 DIAGNOSIS — G43.001 MIGRAINE WITHOUT AURA AND WITH STATUS MIGRAINOSUS, NOT INTRACTABLE: ICD-10-CM

## 2019-04-24 DIAGNOSIS — K92.2 GASTROINTESTINAL HEMORRHAGE, UNSPECIFIED GASTROINTESTINAL HEMORRHAGE TYPE: ICD-10-CM

## 2019-04-24 LAB
ABO + RH BLD: NORMAL
ABO + RH BLD: NORMAL
ALBUMIN SERPL-MCNC: 3.7 G/DL (ref 3.4–5)
ALP SERPL-CCNC: 92 U/L (ref 40–150)
ALT SERPL W P-5'-P-CCNC: 39 U/L (ref 0–70)
ANION GAP SERPL CALCULATED.3IONS-SCNC: 7 MMOL/L (ref 3–14)
AST SERPL W P-5'-P-CCNC: 31 U/L (ref 0–45)
BASOPHILS # BLD AUTO: 0 10E9/L (ref 0–0.2)
BASOPHILS NFR BLD AUTO: 0.5 %
BILIRUB SERPL-MCNC: 0.3 MG/DL (ref 0.2–1.3)
BLD GP AB SCN SERPL QL: NORMAL
BLOOD BANK CMNT PATIENT-IMP: NORMAL
BUN SERPL-MCNC: 14 MG/DL (ref 7–30)
CA-I BLD-SCNC: 5 MG/DL (ref 4.4–5.2)
CALCIUM SERPL-MCNC: 9 MG/DL (ref 8.5–10.1)
CHLORIDE SERPL-SCNC: 107 MMOL/L (ref 94–109)
CO2 BLDCOV-SCNC: 30 MMOL/L (ref 21–28)
CO2 SERPL-SCNC: 28 MMOL/L (ref 20–32)
CREAT SERPL-MCNC: 0.84 MG/DL (ref 0.66–1.25)
CRP SERPL-MCNC: 2.9 MG/L (ref 0–8)
DIFFERENTIAL METHOD BLD: NORMAL
EOSINOPHIL # BLD AUTO: 0.1 10E9/L (ref 0–0.7)
EOSINOPHIL NFR BLD AUTO: 2.1 %
ERYTHROCYTE [DISTWIDTH] IN BLOOD BY AUTOMATED COUNT: 13 % (ref 10–15)
GFR SERPL CREATININE-BSD FRML MDRD: >90 ML/MIN/{1.73_M2}
GLUCOSE BLD-MCNC: 83 MG/DL (ref 70–99)
GLUCOSE SERPL-MCNC: 95 MG/DL (ref 70–99)
HCT VFR BLD AUTO: 42.8 % (ref 40–53)
HCT VFR BLD CALC: 41 %PCV (ref 40–53)
HGB BLD CALC-MCNC: 13.9 G/DL (ref 13.3–17.7)
HGB BLD-MCNC: 13.7 G/DL (ref 13.3–17.7)
IMM GRANULOCYTES # BLD: 0 10E9/L (ref 0–0.4)
IMM GRANULOCYTES NFR BLD: 0 %
INR PPP: 1.04 (ref 0.86–1.14)
LYMPHOCYTES # BLD AUTO: 2.1 10E9/L (ref 0.8–5.3)
LYMPHOCYTES NFR BLD AUTO: 48.9 %
MCH RBC QN AUTO: 27.1 PG (ref 26.5–33)
MCHC RBC AUTO-ENTMCNC: 32 G/DL (ref 31.5–36.5)
MCV RBC AUTO: 85 FL (ref 78–100)
MONOCYTES # BLD AUTO: 0.2 10E9/L (ref 0–1.3)
MONOCYTES NFR BLD AUTO: 5.6 %
NEUTROPHILS # BLD AUTO: 1.8 10E9/L (ref 1.6–8.3)
NEUTROPHILS NFR BLD AUTO: 42.9 %
NRBC # BLD AUTO: 0 10*3/UL
NRBC BLD AUTO-RTO: 0 /100
PCO2 BLDV: 45 MM HG (ref 40–50)
PH BLDV: 7.42 PH (ref 7.32–7.43)
PLATELET # BLD AUTO: 220 10E9/L (ref 150–450)
PO2 BLDV: 61 MM HG (ref 25–47)
POTASSIUM BLD-SCNC: 3.4 MMOL/L (ref 3.4–5.3)
POTASSIUM SERPL-SCNC: 3.5 MMOL/L (ref 3.4–5.3)
PROT SERPL-MCNC: 7.5 G/DL (ref 6.8–8.8)
RBC # BLD AUTO: 5.06 10E12/L (ref 4.4–5.9)
SAO2 % BLDV FROM PO2: 91 %
SODIUM BLD-SCNC: 142 MMOL/L (ref 133–144)
SODIUM SERPL-SCNC: 141 MMOL/L (ref 133–144)
SPECIMEN EXP DATE BLD: NORMAL
WBC # BLD AUTO: 4.3 10E9/L (ref 4–11)

## 2019-04-24 PROCEDURE — 82330 ASSAY OF CALCIUM: CPT

## 2019-04-24 PROCEDURE — 86900 BLOOD TYPING SEROLOGIC ABO: CPT | Performed by: EMERGENCY MEDICINE

## 2019-04-24 PROCEDURE — 40000498 ZZHCL STATISTIC POTASSIUM ED POCT

## 2019-04-24 PROCEDURE — 99285 EMERGENCY DEPT VISIT HI MDM: CPT | Performed by: EMERGENCY MEDICINE

## 2019-04-24 PROCEDURE — 86901 BLOOD TYPING SEROLOGIC RH(D): CPT | Performed by: EMERGENCY MEDICINE

## 2019-04-24 PROCEDURE — 80053 COMPREHEN METABOLIC PANEL: CPT | Performed by: EMERGENCY MEDICINE

## 2019-04-24 PROCEDURE — 99236 HOSP IP/OBS SAME DATE HI 85: CPT | Mod: AI | Performed by: HOSPITALIST

## 2019-04-24 PROCEDURE — 86850 RBC ANTIBODY SCREEN: CPT | Performed by: EMERGENCY MEDICINE

## 2019-04-24 PROCEDURE — 85025 COMPLETE CBC W/AUTO DIFF WBC: CPT | Performed by: EMERGENCY MEDICINE

## 2019-04-24 PROCEDURE — 40000502 ZZHCL STATISTIC GLUCOSE ED POCT

## 2019-04-24 PROCEDURE — 99285 EMERGENCY DEPT VISIT HI MDM: CPT | Mod: Z6 | Performed by: EMERGENCY MEDICINE

## 2019-04-24 PROCEDURE — 25000132 ZZH RX MED GY IP 250 OP 250 PS 637: Performed by: EMERGENCY MEDICINE

## 2019-04-24 PROCEDURE — 86140 C-REACTIVE PROTEIN: CPT | Performed by: EMERGENCY MEDICINE

## 2019-04-24 PROCEDURE — 40000497 ZZHCL STATISTIC SODIUM ED POCT

## 2019-04-24 PROCEDURE — 40000501 ZZHCL STATISTIC HEMATOCRIT ED POCT

## 2019-04-24 PROCEDURE — 12000001 ZZH R&B MED SURG/OB UMMC

## 2019-04-24 PROCEDURE — 82803 BLOOD GASES ANY COMBINATION: CPT

## 2019-04-24 PROCEDURE — 85610 PROTHROMBIN TIME: CPT | Performed by: EMERGENCY MEDICINE

## 2019-04-24 RX ORDER — POLYETHYLENE GLYCOL 3350 17 G/17G
17 POWDER, FOR SOLUTION ORAL DAILY PRN
Status: DISCONTINUED | OUTPATIENT
Start: 2019-04-24 | End: 2019-04-24 | Stop reason: HOSPADM

## 2019-04-24 RX ORDER — SODIUM CHLORIDE, SODIUM LACTATE, POTASSIUM CHLORIDE, CALCIUM CHLORIDE 600; 310; 30; 20 MG/100ML; MG/100ML; MG/100ML; MG/100ML
INJECTION, SOLUTION INTRAVENOUS CONTINUOUS
Status: DISCONTINUED | OUTPATIENT
Start: 2019-04-24 | End: 2019-04-24 | Stop reason: HOSPADM

## 2019-04-24 RX ORDER — AMOXICILLIN 250 MG
2 CAPSULE ORAL 2 TIMES DAILY
Status: DISCONTINUED | OUTPATIENT
Start: 2019-04-24 | End: 2019-04-24 | Stop reason: HOSPADM

## 2019-04-24 RX ORDER — AMOXICILLIN 250 MG
1 CAPSULE ORAL 2 TIMES DAILY
Status: DISCONTINUED | OUTPATIENT
Start: 2019-04-24 | End: 2019-04-24 | Stop reason: HOSPADM

## 2019-04-24 RX ORDER — AMLODIPINE BESYLATE 5 MG/1
5 TABLET ORAL DAILY
Status: DISCONTINUED | OUTPATIENT
Start: 2019-04-24 | End: 2019-04-24 | Stop reason: HOSPADM

## 2019-04-24 RX ORDER — ACETAMINOPHEN 325 MG/1
650 TABLET ORAL EVERY 4 HOURS PRN
Status: DISCONTINUED | OUTPATIENT
Start: 2019-04-24 | End: 2019-04-24 | Stop reason: HOSPADM

## 2019-04-24 RX ORDER — AMLODIPINE BESYLATE 5 MG/1
5 TABLET ORAL ONCE
Status: COMPLETED | OUTPATIENT
Start: 2019-04-24 | End: 2019-04-24

## 2019-04-24 RX ORDER — SUMATRIPTAN 25 MG/1
25 TABLET, FILM COATED ORAL
Qty: 6 TABLET | Refills: 0 | Status: SHIPPED | OUTPATIENT
Start: 2019-04-24 | End: 2021-05-17

## 2019-04-24 RX ORDER — NALOXONE HYDROCHLORIDE 0.4 MG/ML
.1-.4 INJECTION, SOLUTION INTRAMUSCULAR; INTRAVENOUS; SUBCUTANEOUS
Status: DISCONTINUED | OUTPATIENT
Start: 2019-04-24 | End: 2019-04-24 | Stop reason: HOSPADM

## 2019-04-24 RX ADMIN — AMLODIPINE BESYLATE 5 MG: 5 TABLET ORAL at 13:03

## 2019-04-24 ASSESSMENT — ENCOUNTER SYMPTOMS
BLOOD IN STOOL: 1
ADENOPATHY: 0
POLYDIPSIA: 0
COLOR CHANGE: 0
CONFUSION: 0
FEVER: 0
VOMITING: 0
CHILLS: 0
ABDOMINAL PAIN: 0
NAUSEA: 0
DIARRHEA: 0
BRUISES/BLEEDS EASILY: 0
CONSTIPATION: 0
EYE REDNESS: 0
SHORTNESS OF BREATH: 0
NECK STIFFNESS: 0
RECTAL PAIN: 0
LIGHT-HEADEDNESS: 0
ARTHRALGIAS: 0
DIFFICULTY URINATING: 0
HEADACHES: 0

## 2019-04-24 ASSESSMENT — MIFFLIN-ST. JEOR: SCORE: 1694.18

## 2019-04-24 NOTE — ED NOTES
"[facility], [place]   ED Nurse to Floor Handoff     Ephraim Rebolledo is a 38 year old male who speaks English and lives with family members,  in a home  They arrived in the ED by walking from home    ED Chief Complaint: Rectal Bleeding    ED Dx;   Final diagnoses:   Bright red blood per rectum   Gastrointestinal hemorrhage, unspecified gastrointestinal hemorrhage type         Needed?: No    Allergies: No Known Allergies.  Past Medical Hx:   Past Medical History:   Diagnosis Date     Measles as Child      Baseline Mental status: WDL  Current Mental Status changes: at basesline    Infection present or suspected this encounter: no  Sepsis suspected: No  Isolation type: No active isolations     Activity level - Baseline/Home:  Independent  Activity Level - Current:   Independent    Bariatric equipment needed?: No    In the ED these meds were given:   Medications   amLODIPine (NORVASC) tablet 5 mg (5 mg Oral Given 4/24/19 1303)       Drips running?  No    Home pump  No    Current LDAs  Peripheral IV 04/24/19 Right Hand (Active)   Number of days: 0       Labs results:   Labs Ordered and Resulted from Time of ED Arrival Up to the Time of Departure from the ED   CBC WITH PLATELETS DIFFERENTIAL   COMPREHENSIVE METABOLIC PANEL   INR   PERIPHERAL IV CATHETER   ABO/RH TYPE AND SCREEN       Imaging Studies: No results found for this or any previous visit (from the past 24 hour(s)).    Recent vital signs:   BP (!) 161/115   Pulse 65   Temp 98.7  F (37.1  C) (Oral)   Resp 16   Ht 1.702 m (5' 7\")   Wt 82.6 kg (182 lb)   SpO2 100%   BMI 28.51 kg/m      Vee Coma Scale Score: 15 (04/24/19 0947)       Cardiac Rhythm: Normal Sinus  Pt needs tele? No  Skin/wound Issues: None    Code Status: Full Code    Pain control: pt had none    Nausea control: pt had none    Abnormal labs/tests/findings requiring intervention: Patient has been experiencing bright red bloody stools; needs observation " and serial hemoglobins.     Family present during ED course? 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  He and his wife have children at home; needs no additional assistance at home     Tasks needing completion: None    TARYN GONZALEZ, RN  [phone] [facility]

## 2019-04-24 NOTE — H&P
History and Physical  April 24, 2019           History of Present Illness:   Ephraim Rebolledo is a 38 year old male with hx of HTN who presents with BRBPR.     Patient with new bright red blood per rectum with blood clots x 3. He reports tenesmus and most recent episode he just defecated blood clots. Denies constipation, hemorrhoids, fissures, hx of IBD, personal or family hx of colon cancer, NVD, abdominal pain, rectal pain, painful defecation, fever, chills, weight loss. Not on AC. One previous episode eight years ago. On excedrin for headache.          Review of Systems:   Complete review of system negative except as noted in HPI         Past Medical History:     Past Medical History:   Diagnosis Date     Measles as Child           Past Surgical History:     Past Surgical History:   Procedure Laterality Date     NO HISTORY OF SURGERY             Allergies:   No Known Allergies          Family History:     Family History   Problem Relation Age of Onset     Hypertension Mother      Heart Disease Brother         cardiomegaly             Medications:     Current Facility-Administered Medications   Medication     amLODIPine (NORVASC) tablet 5 mg     Current Outpatient Medications   Medication Sig     amLODIPine (NORVASC) 5 MG tablet Take 1 tablet (5 mg) by mouth daily     aspirin-acetaminophen-caffeine (EXCEDRIN MIGRAINE) 250-250-65 MG tablet Take 1 tablet by mouth every 6 hours as needed for headaches           Physical Examination:   Temp:  [98.7  F (37.1  C)] 98.7  F (37.1  C)  Pulse:  [69] 69  Resp:  [16] 16  BP: (172)/(68) 172/68  SpO2:  [98 %] 98 %  Wt Readings from Last 4 Encounters:   04/24/19 82.6 kg (182 lb)   02/26/19 84.2 kg (185 lb 9.6 oz)   08/30/18 83.5 kg (184 lb)   07/11/17 81.2 kg (179 lb)     No intake or output data in the 24 hours ending 04/24/19 1248    Gen: NAD  HEENT: NACT, PER, EOMI, MMM, OP clear  NECK: Supple, no LAD  PULM: Clear to auscultation bilaterally, no rales/rhonchi/wheezes  CV:  RRR, nl S1 and S2, no murmurs/rubs/gallop. No JVD  ABD:  soft, nontender, nondistended  Rectal: No fissures, hemorrhoids, + bright blood   EXT: No edema  NEURO: CN II-XII intact, moves all extremities   SKIN: No acute lesions or rashes.         Data:     Labs reviewed          Assessment and Plan:   Ephraim Rebolledo is a 38 year old male with hx of HTN who presents with BRBPR.     # Hematochezia:   Three BMs with bright blood clots. Hb and vitals stable. Last episode ~ 8 years ago. No evidence external hemorrhoids vs anal fissures. Ddx internal hemorrhoids vs polyps vs AVM vs infectious (shigella, c diff)  - GI consult appreciate recs    - Add CRP   - Enteric panel and c diff  - NPO  - BID Hb check   - Type and screen   - Will consent patient once admitted to floor     # HTN: Continue amlodipine       Floor Care  Code Status: Full   Discharge plan: 1-2 days      Patient discussed with staff attending, Dr. Cullen.     Efra Feldman MD   Internal Medicine PGY3  Pager: [phone]

## 2019-04-24 NOTE — ED NOTES
Patient up to restroom and experienced large bright red loose stool.  Toilet bowl full of bloody stool; Dr Patiño notified.

## 2019-04-24 NOTE — ED PROVIDER NOTES
[place] EMERGENCY DEPARTMENT ([facility])  4/24/19    History     Chief Complaint   Patient presents with     Rectal Bleeding     HPI  Ephraim Rebolledo is a 38 year old male with a history of hypertension who presents to the Emergency Department for evaluation of rectal bleeding. Patient had a small, hard bowel movement this morning and noticed blood in the toilet, as well as blood clots on the toilet paper. He also reports a mild headache. Yesterday (4/23), he states that he was in clinicals all day and did not eat until last last night. He denies fevers, vomiting, abdominal pain or rectal pain. He does not heavily consume alcohol or ibuprofen, and does not take anticoagulants or aspirin.      He states that this happened once previously, approximately 3-4 years ago. At that time, he was seen in the clinic where he was asked if he was sexual active with the same sex. He denied this and stated that he is  to a female. He did not have further testing. Patient denies family history of ulcerative colitis or Chron's disease.     Current Facility-Administered Medications   Medication     acetaminophen (TYLENOL) tablet 650 mg     amLODIPine (NORVASC) tablet 5 mg     melatonin tablet 1 mg     naloxone (NARCAN) injection 0.1-0.4 mg     polyethylene glycol (MIRALAX/GLYCOLAX) Packet 17 g     senna-docusate (SENOKOT-S/PERICOLACE) 8.6-50 MG per tablet 1 tablet    Or     senna-docusate (SENOKOT-S/PERICOLACE) 8.6-50 MG per tablet 2 tablet     Current Outpatient Medications   Medication     amLODIPine (NORVASC) 5 MG tablet     aspirin-acetaminophen-caffeine (EXCEDRIN MIGRAINE) 250-250-65 MG tablet     Past Medical History:   Diagnosis Date     Measles as Child       Past Surgical History:   Procedure Laterality Date     NO HISTORY OF SURGERY         Family History   Problem Relation Age of Onset     Hypertension Mother      Heart Disease Brother         cardiomegaly       Social History     Tobacco Use      "Smoking status: Never Smoker     Smokeless tobacco: Never Used   Substance Use Topics     Alcohol use: Yes     Comment: occasionally, couple times a year     No Known Allergies    I have reviewed the Medications, Allergies, Past Medical and Surgical History, and Social History in the Epic system.    Review of Systems   Constitutional: Negative for chills and fever.   HENT: Negative for congestion.    Eyes: Negative for redness.   Respiratory: Negative for shortness of breath.    Cardiovascular: Negative for chest pain.   Gastrointestinal: Positive for blood in stool. Negative for abdominal pain, constipation, diarrhea, nausea, rectal pain and vomiting.   Endocrine: Negative for polydipsia and polyuria.   Genitourinary: Negative for difficulty urinating.   Musculoskeletal: Negative for arthralgias, gait problem and neck stiffness.   Skin: Negative for color change.   Allergic/Immunologic: Negative for immunocompromised state.   Neurological: Negative for light-headedness and headaches.   Hematological: Negative for adenopathy. Does not bruise/bleed easily.   Psychiatric/Behavioral: Negative for confusion.       Physical Exam   BP: 172/68  Pulse: 69  Heart Rate: 65  Temp: 98.7  F (37.1  C)  Resp: 16  Height: 170.2 cm (5' 7\")  Weight: 82.6 kg (182 lb)  SpO2: 98 %      Physical Exam   Constitutional: He is oriented to person, place, and time. He appears well-developed and well-nourished. No distress.   HENT:   Head: Normocephalic and atraumatic.   Mouth/Throat: Oropharynx is clear and moist.   Eyes: Pupils are equal, round, and reactive to light. EOM are normal. No scleral icterus.   Neck: Normal range of motion.   Cardiovascular: Normal rate, normal heart sounds and intact distal pulses.   Pulmonary/Chest: Effort normal and breath sounds normal. No respiratory distress.   Abdominal: Soft. Bowel sounds are normal. He exhibits no distension and no mass. There is no tenderness. There is no rebound and no guarding. No " hernia.   Genitourinary: Rectal exam shows guaiac positive stool.   Genitourinary Comments: Trace blood on digital rectal examination.  No stool in rectal vault.  No hemorrhoids noted.  No anal fissures.  No anal/rectal masses.   Musculoskeletal: Normal range of motion. He exhibits no edema or tenderness.   Neurological: He is alert and oriented to person, place, and time. No cranial nerve deficit. He exhibits normal muscle tone. Coordination normal.   Skin: Skin is warm. No rash noted. He is not diaphoretic.   Psychiatric: He has a normal mood and affect. His behavior is normal. Judgment and thought content normal.   Nursing note and vitals reviewed.      ED Course        Procedures             Critical Care time:  none             Labs Ordered and Resulted from Time of ED Arrival Up to the Time of Departure from the ED   CBC WITH PLATELETS DIFFERENTIAL   COMPREHENSIVE METABOLIC PANEL   INR   PERIPHERAL IV CATHETER   NO VTE PROPHYLAXIS   IP ASSIGN PROVIDER TEAM TO TREATMENT TEAM   VITAL SIGNS   NO INDWELLING URINARY CATHETER (LOPEZ) PRESENT OR NEEDED   BLADDER SCAN   ACTIVITY   ABO/RH TYPE AND SCREEN            Assessments & Plan (with Medical Decision Making)   38-year-old man presenting with bloody stool this morning. Differential diagnosis: hematochezia, upper GI bleed, diverticulosis, AVM, unlikely anemia, hemorrhoids.    After thorough history and physical exam, patient appears to be in no acute distress. I will obtain laboratory studies for further diagnostic evaluation. Patient agrees with the plan.     Patient laboratory studies returned with no leukocytosis, WBC is normal at 4300. There is no anemia, hemoglobin is normal at 13.7. Electrolytes showed no evidence of dehydration, creatinine is 0.84. LFTs are normal. INR is normal at 1.04.    Patient had bright red blood per rectum in the emergency department, significant amount.  This was witnessed by the nurse.  He will be admitted to the hospital for  further evaluation and management.  He agrees with plan.    This part of the medical record was transcribed by Augustina Mayer, Medical Scribe, from a dictation done by Travis Patiño MD.     I have reviewed the nursing notes.    I have reviewed the findings, diagnosis, plan and need for follow up with the patient.       Medication List      There are no discharge medications for this visit.         Final diagnoses:   Bright red blood per rectum   Gastrointestinal hemorrhage, unspecified gastrointestinal hemorrhage type   I, Augustina Mayer, am serving as a trained medical scribe to document services personally performed by Aaron Patiño MD, based on the provider's statements to me.   I, Aaron Patiño MD, was physically present and have reviewed and verified the accuracy of this note documented by Augustina Mayer.    4/24/2019   [facility], [place], EMERGENCY DEPARTMENT     Travis Patiño MD  04/24/19 0711

## 2019-04-24 NOTE — ED TRIAGE NOTES
Patient here with one episode rectal bleeding this morning. Reports it as bright red, some clots. No blood thinners.

## 2019-04-24 NOTE — CONSULTS
GASTROENTEROLOGY CONSULTATION      Date of Admission:  4/24/2019           Reason for Consultation:   We were asked by Dr. Hurt of medicine to evaluate this patient with hematochezia         ASSESSMENT AND RECOMMENDATIONS:   Assessment:  38 year old male with a history of HTN on amlodipine, prior episode of painless hematochezia 7-8 years prior who GI was consulted on for painless hematochezia. Hemodynamically stable and Hgb normal at 13.7. Potential etiologies include diverticular bleeding, AVM, internal hemorrhoids. Less likely ischemic/infectious colitis without preceding diarrhea/pain, respectively. Low suspicion for IBD/malignancy.     He is overall hemodynamically stable with normal hemoglobin, and thus pursuit of outpatient colonoscopy would be reasonable. If possible, send enteric stool panel and PCR prior to discharge.      Recommendations  - Enteric and C. Diff PCR if able  - GI will arrange for outpatient colonoscopy within 4 weeks  - Close monitoring of stool output, with return if further bleeding    GI will sign off    Thank you for involving us in this patient's care. Please do not hesitate to contact the GI service with any questions or concerns.     Pt care plan discussed with Dr. Patel, GI staff physician.    Blaise Rahman MD  GI Fellow  P: [phone]  -------------------------------------------------------------------------------------------------------------------    History of Present Illness   Ephraim Rebolledo is a 38 year old male with a history of HTN on amlodipine who GI was consulted on for hematochezia.     This AM, noted red blood on toilet paper after BM, then looked in toilet and saw stool was bloody with clots. He then presented to ED and had 2 additional episodes of BRB with clots in ED. Denies abdominal pain, fevers, chills, perirectal pain, N/V, black stool. Was having small amounts of stool in 2 days preceding this, but no straining. Normal bowel pattern is 2 formed  brown BMs/day.     Had similar episode to this 7-8 years ago, seen for it but it self-resolved.     No prior EGD/colon    Non-smoker, no EtOH/drugs. In nursing school.     No known FH of GI disease.     Does take on average 1 Excedrin daily.     Past Medical History    Reviewed and edited as appropriate  Past Medical History:   Diagnosis Date     Measles as Child     Past Surgical History   Reviewed and edited as appropriate   Past Surgical History:   Procedure Laterality Date     NO HISTORY OF SURGERY       Social History   Reviewed and edited as appropriate  Social History     Socioeconomic History     Marital status:      Spouse name: Alan     Number of children: 2     Years of education: Not on file     Highest education level: Not on file   Occupational History     Occupation: phlebotomist     Employer: [employer]   Social Needs     Financial resource strain: Not on file     Food insecurity:     Worry: Not on file     Inability: Not on file     Transportation needs:     Medical: Not on file     Non-medical: Not on file   Tobacco Use     Smoking status: Never Smoker     Smokeless tobacco: Never Used   Substance and Sexual Activity     Alcohol use: Yes     Comment: occasionally, couple times a year     Drug use: No     Sexual activity: Yes     Partners: Female   Lifestyle     Physical activity:     Days per week: Not on file     Minutes per session: Not on file     Stress: Not on file   Relationships     Social connections:     Talks on phone: Not on file     Gets together: Not on file     Attends Judaism service: Not on file     Active member of club or organization: Not on file     Attends meetings of clubs or organizations: Not on file     Relationship status: Not on file     Intimate partner violence:     Fear of current or ex partner: Not on file     Emotionally abused: Not on file     Physically abused: Not on file     Forced sexual activity: Not on file   Other Topics Concern      "Parent/sibling w/ CABG, MI or angioplasty before 65F 55M? No   Social History Narrative    9/19/2014 Francisco has two children, ages 7 and 8.  He is currently in the process of going through a divorce.  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    Allergies   Reviewed and edited as appropriate   No Known Allergies     Prior to Admission Medications    Current Facility-Administered Medications   Medication     acetaminophen (TYLENOL) tablet 650 mg     amLODIPine (NORVASC) tablet 5 mg     melatonin tablet 1 mg     naloxone (NARCAN) injection 0.1-0.4 mg     polyethylene glycol (MIRALAX/GLYCOLAX) Packet 17 g     senna-docusate (SENOKOT-S/PERICOLACE) 8.6-50 MG per tablet 1 tablet    Or     senna-docusate (SENOKOT-S/PERICOLACE) 8.6-50 MG per tablet 2 tablet     Current Outpatient Medications   Medication Sig     amLODIPine (NORVASC) 5 MG tablet Take 1 tablet (5 mg) by mouth daily     aspirin-acetaminophen-caffeine (EXCEDRIN MIGRAINE) 250-250-65 MG tablet Take 1 tablet by mouth every 6 hours as needed for headaches      Review of Systems   A complete review of systems was performed and is negative except as noted in the HPI      Physical Exam   BP (!) 146/106   Pulse 65   Temp 98.7  F (37.1  C) (Oral)   Resp 16   Ht 1.702 m (5' 7\")   Wt 82.6 kg (182 lb)   SpO2 99%   BMI 28.51 kg/m    Wt:   Wt Readings from Last 2 Encounters:   04/24/19 82.6 kg (182 lb)   02/26/19 84.2 kg (185 lb 9.6 oz)      Constitutional: cooperative, pleasant, not dyspneic/diaphoretic, no acute distress  Eyes: Sclera anicteric/injected  Ears/nose/mouth/throat: Normal oropharynx without ulcers or exudate, mucus membranes moist,  Neck: supple  CV: No " edema  Respiratory: Unlabored breathing  Abd: Nondistended, +bs, no hepatosplenomegaly, nontender, no peritoneal signs  Rectal: External exam only per patient preference - small skin tag, no hemorrhoids, or fissures   Skin: warm, perfused, no jaundice  Neuro: AAO x 3  Psych: Normal affect  MSK: No gross deformities    Data   Labs and imaging below were independently reviewed and interpreted    BMP  Recent Labs   Lab 04/24/19  1031      POTASSIUM 3.5   CHLORIDE 107   GEORGES 9.0   CO2 28   BUN 14   CR 0.84   GLC 95     CBC  Recent Labs   Lab 04/24/19  1031   WBC 4.3   RBC 5.06   HGB 13.7   HCT 42.8   MCV 85   MCH 27.1   MCHC 32.0   RDW 13.0        INR  Recent Labs   Lab 04/24/19  1031   INR 1.04     LFTs  Recent Labs   Lab 04/24/19  1031   ALKPHOS 92   AST 31   ALT 39   BILITOTAL 0.3   PROTTOTAL 7.5   ALBUMIN 3.7

## 2019-04-24 NOTE — PHARMACY-ADMISSION MEDICATION HISTORY
Admission Medication History status for the 2019 admission is complete.  See EPIC admission navigator for Prior to Admission medications.  Patient's Name: Ephraim Rebolledo  Patient's : 1980   38 year old, male    Medication history sources: patient   Primary Pharmacy: [facility] in [place] ([phone])    Medication history source reliability: Good  Medication adherence:  Good    Changes made to PTA medication list (reason)  Added: none  Deleted: ascorbic acid (no longer taking)  Changed: none    Additional medication history information (including reliability of information, actions taken by pharmacist): Patient prefers not to be given ibuprofen for pain relief because he does not feel it works well for him.    Time spent in this activity: 10 minutes    Medication history completed by:  Shmuel Catherine, Pharmacy Student    Prior to Admission medications    Medication Sig Last Dose Taking? Auth Provider   amLODIPine (NORVASC) 5 MG tablet Take 1 tablet (5 mg) by mouth daily 2019 at Unknown time Yes Radha Daniels, DO   aspirin-acetaminophen-caffeine (EXCEDRIN MIGRAINE) 250-250-65 MG tablet Take 1 tablet by mouth every 6 hours as needed for headaches Past Week at Unknown time Yes Reported, Patient

## 2019-04-24 NOTE — ED NOTES
Pt. Discharged from ED per admitting team Abraham 2. Hgb rechecked and stable. Discharge instructions reviewed and prescription ready in discharge pharmacy for patient. AVSS on RA prior to discharge. Pt. Denies questions and verbalizes understanding of meds and plan to follow up outpatient with GI.

## 2019-04-25 ENCOUNTER — TELEPHONE (OUTPATIENT)
Dept: FAMILY MEDICINE | Facility: CLINIC | Age: 39
End: 2019-04-25

## 2019-04-25 ENCOUNTER — TELEPHONE (OUTPATIENT)
Dept: GASTROENTEROLOGY | Facility: CLINIC | Age: 39
End: 2019-04-25

## 2019-04-25 NOTE — TELEPHONE ENCOUNTER
IP/ER Outreach Protocol    Patient Contact    Attempt # 1    Was call answered?  No.  Left message on voicemail with information to call me back.    Shannan Tripathi RN, [facility]

## 2019-04-25 NOTE — TELEPHONE ENCOUNTER
Patient discharged from [facility] IP  ( Inpatient or ER).    Discharge location: [facility]  Discharge date: 4/24/19  Diagnosis: Bright Red Blood Per Rectum, Rectal Bleeding  Patient has been in the ER/IP 0/0 times.  Care Coord:  NA  Please follow up as appropriate. If no follow up required, please close encounter.

## 2019-04-25 NOTE — DISCHARGE SUMMARY
Patient was admitted to medicine, but patient preferred going home. GI saw patient in ED and cleared. Patient was discharged from the ED and instructions given to patient. He is to follow up in outpatient in 4 weeks for c-sope.       Patient staffed with Dr. Hurt.     Efra Feldman MD  Internal Medicine PGY3  Pager: [phone]

## 2019-04-26 ENCOUNTER — TELEPHONE (OUTPATIENT)
Dept: GASTROENTEROLOGY | Facility: CLINIC | Age: 39
End: 2019-04-26

## 2019-04-26 ENCOUNTER — HOSPITAL ENCOUNTER (OUTPATIENT)
Facility: AMBULATORY SURGERY CENTER | Age: 39
End: 2019-04-26
Attending: INTERNAL MEDICINE
Payer: COMMERCIAL

## 2019-04-26 NOTE — TELEPHONE ENCOUNTER
ED / Discharge Outreach Protocol    Patient Contact    Attempt # 2    Was call answered?  No.  Left message on voicemail with information to call me back.        Ana Sue RN, BSN

## 2019-04-26 NOTE — TELEPHONE ENCOUNTER
Referring Provider: Blaise Rahman MD on     What is your current height? 5'6    What is your current weight?185    Are you on daily home oxygen? no    Have you had a heart or lung transplant? no      In the past year, have you had any heart related issues  Including cardiomyopathy or a MI within 6 months, that required cardiac stenting or other implantable devices? no    What type of implantable device do you have? no    Do you take nitroglycerin? If yes, how often? no    Are you currently taking any blood thinners?no      (Females) Are you currently pregnant? no  If yes, how many weeks?      Have you had a procedure in the past that was difficult to tolerate with conscious sedation? Any allergies to Fentanyl or Versed no        Do you currently use any of the following?    Are you taking any scheduled prescription narcotics more than once daily? no    Drink alcohol daily? no    Currently using any street drugs or methadone?no    Do you have any history of post-traumatic stress syndrome or mental health issues? no

## 2019-04-29 NOTE — TELEPHONE ENCOUNTER
ED / Discharge Outreach Protocol    Patient Contact    Attempt # 3    Was call answered?  No.  Left message on voicemail with information to call me back.    Cher Morton RN

## 2019-05-20 ENCOUNTER — TELEPHONE (OUTPATIENT)
Dept: GASTROENTEROLOGY | Facility: CLINIC | Age: 39
End: 2019-05-20

## 2019-05-28 ENCOUNTER — TELEPHONE (OUTPATIENT)
Dept: GASTROENTEROLOGY | Facility: CLINIC | Age: 39
End: 2019-05-28

## 2019-06-03 ENCOUNTER — TELEPHONE (OUTPATIENT)
Dept: GASTROENTEROLOGY | Facility: CLINIC | Age: 39
End: 2019-06-03

## 2019-06-14 ENCOUNTER — OFFICE VISIT (OUTPATIENT)
Dept: FAMILY MEDICINE | Facility: CLINIC | Age: 39
End: 2019-06-14
Payer: COMMERCIAL

## 2019-06-14 VITALS — DIASTOLIC BLOOD PRESSURE: 84 MMHG | SYSTOLIC BLOOD PRESSURE: 122 MMHG

## 2019-06-14 DIAGNOSIS — R73.09 ELEVATED HEMOGLOBIN A1C: ICD-10-CM

## 2019-06-14 DIAGNOSIS — R41.840 INATTENTION: ICD-10-CM

## 2019-06-14 DIAGNOSIS — K62.5 RECTAL BLEEDING: Primary | ICD-10-CM

## 2019-06-14 LAB
BASOPHILS # BLD AUTO: 0 10E9/L (ref 0–0.2)
BASOPHILS NFR BLD AUTO: 0.4 %
DIFFERENTIAL METHOD BLD: NORMAL
EOSINOPHIL # BLD AUTO: 0.1 10E9/L (ref 0–0.7)
EOSINOPHIL NFR BLD AUTO: 2.5 %
ERYTHROCYTE [DISTWIDTH] IN BLOOD BY AUTOMATED COUNT: 14 % (ref 10–15)
HBA1C MFR BLD: 5.5 % (ref 0–5.6)
HCT VFR BLD AUTO: 40.2 % (ref 40–53)
HGB BLD-MCNC: 13.4 G/DL (ref 13.3–17.7)
LYMPHOCYTES # BLD AUTO: 3.1 10E9/L (ref 0.8–5.3)
LYMPHOCYTES NFR BLD AUTO: 55.1 %
MCH RBC QN AUTO: 27.6 PG (ref 26.5–33)
MCHC RBC AUTO-ENTMCNC: 33.3 G/DL (ref 31.5–36.5)
MCV RBC AUTO: 83 FL (ref 78–100)
MONOCYTES # BLD AUTO: 0.5 10E9/L (ref 0–1.3)
MONOCYTES NFR BLD AUTO: 8 %
NEUTROPHILS # BLD AUTO: 1.9 10E9/L (ref 1.6–8.3)
NEUTROPHILS NFR BLD AUTO: 34 %
PLATELET # BLD AUTO: 226 10E9/L (ref 150–450)
RBC # BLD AUTO: 4.85 10E12/L (ref 4.4–5.9)
WBC # BLD AUTO: 5.6 10E9/L (ref 4–11)

## 2019-06-14 PROCEDURE — 99214 OFFICE O/P EST MOD 30 MIN: CPT | Performed by: FAMILY MEDICINE

## 2019-06-14 PROCEDURE — 85025 COMPLETE CBC W/AUTO DIFF WBC: CPT | Performed by: FAMILY MEDICINE

## 2019-06-14 PROCEDURE — 83036 HEMOGLOBIN GLYCOSYLATED A1C: CPT | Performed by: FAMILY MEDICINE

## 2019-06-14 PROCEDURE — 36415 COLL VENOUS BLD VENIPUNCTURE: CPT | Performed by: FAMILY MEDICINE

## 2019-06-14 RX ORDER — TRAZODONE HYDROCHLORIDE 50 MG/1
1 TABLET, FILM COATED ORAL EVERY EVENING
COMMUNITY
Start: 2019-02-26 | End: 2019-06-14

## 2019-06-14 ASSESSMENT — PAIN SCALES - GENERAL: PAINLEVEL: NO PAIN (0)

## 2019-06-14 NOTE — PROGRESS NOTES
Subjective     Ephraim Rebolledo is a 38 year old male who presents to clinic today for the following health issues:    HPI   1) Patient was seen in the ER on 4/24 with copious amount of rectal bleeding/passing clots.  GI evaluated him and recommended outpt colonoscopy, but they have cancelled his scope twice now.  He is requesting a referral to get the scope done with a different group.  He denies any further rectal bleeding.  Denies abd pain, diarrhea, constipation.    2) Patient had discussed some inattention issues with me in the past and we talked about considering eval for ADHD.  He has decided that he would like to get evaluated for this.      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  It was slightly elevated, so he'd like to make sure it's not higher now.          Patient Active Problem List   Diagnosis     CARDIOVASCULAR SCREENING; LDL GOAL LESS THAN 160     Vitamin D deficiency     Thrombophlebitis of arm, right     HTN, goal below 140/90     Overweight (BMI 25.0-29.9)     Rectal bleeding     Past Surgical History:   Procedure Laterality Date     NO HISTORY OF SURGERY         Social History     Tobacco Use     Smoking status: Never Smoker     Smokeless tobacco: Never Used   Substance Use Topics     Alcohol use: Yes     Comment: occasionally, couple times a year     Family History   Problem Relation Age of Onset     Hypertension Mother      Heart Disease Brother         cardiomegaly         Reviewed and updated as needed this visit by Provider         Review of Systems   ROS COMP: Constitutional, HEENT, cardiovascular, pulmonary, gi and gu systems are negative, except as otherwise noted.      Objective    /84   There is no height or weight on file to calculate BMI.  Physical Exam   GENERAL: healthy, alert and no distress  RESP: lungs clear to auscultation - no rales, rhonchi or wheezes  CV: regular rates and rhythm, normal S1 S2, no S3 or S4 and no murmur, click or  rub  ABDOMEN: soft, nontender, no hepatosplenomegaly, no masses and bowel sounds normal        Assessment & Plan     1. Rectal bleeding  - Now resolved, but should still get the colonoscopy done  - Referred to [facility]  - GASTROENTEROLOGY ADULT REF PROCEDURE ONLY Other; [place] [facility] [phone]  - CBC with platelets differential    2. Inattention  - Will get him in for ADHD eval   - MENTAL HEALTH REFERRAL  - Adult; Assessments and Testing; ADHD; Other: [facility] [phone]; We will contact you to schedule the appointment or please call with any questions    3. Elevated hemoglobin A1c  - Hemoglobin A1c     Return in about 1 month (around 7/12/2019). or after ADHD eval is completed     Radha Daniels,   [facility]

## 2019-06-14 NOTE — PATIENT INSTRUCTIONS
[facility]     Discharged by : Fidelia KUMARI MA    If you have any questions regarding your visit please contact your care team:     Team Estephania              Clinic Hours Telephone Number     Dr. Rory Aviles, RYDER   7am-7pm  Monday - Thursday   7am-5pm  Fridays  [phone]   (Appointment scheduling available 24/7)     RN Line  [phone] option 2     Urgent Care - [place] and [place] - 11am-9pm Monday-Friday Saturday-Sunday- 9am-5pm     [place] -   5pm-9pm Monday-Friday Saturday-Sunday- 9am-5pm    [phone] - [place]    [phone] - [place]     For a Price Quote for your services, please call our [employer] Price Line at [phone].     What options do I have for visits at the clinic other than the traditional office visit?     To expand how we care for you, many of our providers are utilizing electronic visits (e-visits) and telephone visits, when medically appropriate, for interactions with their patients rather than a visit in the clinic. We also offer nurse visits for many medical concerns. Just like any other service, we will bill your insurance company for this type of visit based on time spent on the phone with your provider. Not all insurance companies cover these visits. Please check with your medical insurance if this type of visit is covered. You will be responsible for any charges that are not paid by your insurance.     E-visits via [employer]: generally incur a $45.00 fee.     Telephone visits:  Time spent on the phone: *charged based on time that is spent on the phone in increments of 10 minutes. Estimated cost:   5-10 mins $30.00   11-20 mins. $59.00   21-30 mins. $85.00       Use MenuSpringt (secure email communication and access to your chart) to send your primary care provider a message or make an appointment. Ask someone on your Team how to sign up for MenuSpringt.     As always, Thank you for trusting us with  your health care needs!      [facility]:    Scheduling Appointments  [facility]  Call: [phone]    [place]  Call: [phone]    [facility]  Call: [phone]    For Gastroenterology referrals   [facility]   Cl[facility] and [facility] Center, 4th Floor   [address] [place] [address]   Appointments: [phone]    WHERE TO GO FOR CARE?    Clinic    Make an appointment if you:       Are sick (cold, cough, flu, sore throat, earache or in pain).       Have a small injury (sprain, small cut, burn or broken bone).       Need a physical exam, Pap smear, vaccine or prescription refill.       Have questions about your health or medicines.    To reach us:      Call [phone] ([phone]). Open 24 hours every day. (For counseling services, call [phone].)    Log into [employer] at [employer]. (Visit [email or web address] to create an account.) Hospital emergency room    An emergency is a serious or life- threatening problem that must be treated right away.    Call 458 or get to the hospital if you have:      Very bad or sudden:            - Chest pain or pressure         - Bleeding         - Head or belly pain         - Dizziness or trouble seeing, walking or                          Speaking      Problems breathing      Blood in your vomit or you are coughing up blood      A major injury (knocked out, loss of a finger or limb, rape, broken bone protruding from skin)    A mental health crisis. (Or call the Mental Health Crisis line at 1-339.282.9300 or Suicide Prevention Hotline at 1-646.149.2294.)    Open 24 hours every day. You don't need an appointment.     Urgent care    Visit urgent care for sickness or small injuries when the clinic is closed. You don't need an appointment. To check hours or find an urgent care near you, visit [email or web address]. Online care    Get online care  from [facility] for more than 70 common problems, like colds, allergies and infections. Open 24 hours every day at:   [email or web address]   Need help deciding?    For advice about where to be seen, you may call your clinic and ask to speak with a nurse. We're here for you 24 hours every day.         If you are deaf or hard of hearing, please let us know. We provide many free services including sign language interpreters, oral interpreters, TTYs, telephone amplifiers, note takers and written materials.

## 2019-11-09 ENCOUNTER — HEALTH MAINTENANCE LETTER (OUTPATIENT)
Age: 39
End: 2019-11-09

## 2020-03-05 ENCOUNTER — OFFICE VISIT (OUTPATIENT)
Dept: FAMILY MEDICINE | Facility: CLINIC | Age: 40
End: 2020-03-05
Payer: COMMERCIAL

## 2020-03-05 VITALS
SYSTOLIC BLOOD PRESSURE: 130 MMHG | HEART RATE: 66 BPM | HEIGHT: 67 IN | RESPIRATION RATE: 16 BRPM | BODY MASS INDEX: 28.63 KG/M2 | TEMPERATURE: 99 F | DIASTOLIC BLOOD PRESSURE: 76 MMHG | WEIGHT: 182.4 LBS | OXYGEN SATURATION: 97 %

## 2020-03-05 DIAGNOSIS — R25.3 MUSCLE TWITCHING: ICD-10-CM

## 2020-03-05 DIAGNOSIS — I10 BENIGN ESSENTIAL HYPERTENSION: ICD-10-CM

## 2020-03-05 DIAGNOSIS — E55.9 VITAMIN D DEFICIENCY: ICD-10-CM

## 2020-03-05 DIAGNOSIS — Z13.6 CARDIOVASCULAR SCREENING; LDL GOAL LESS THAN 160: ICD-10-CM

## 2020-03-05 DIAGNOSIS — Z00.00 ROUTINE GENERAL MEDICAL EXAMINATION AT A HEALTH CARE FACILITY: Primary | ICD-10-CM

## 2020-03-05 PROBLEM — K62.5 RECTAL BLEEDING: Status: RESOLVED | Noted: 2019-04-24 | Resolved: 2020-03-05

## 2020-03-05 LAB
FOLATE SERPL-MCNC: 22.2 NG/ML
VIT B12 SERPL-MCNC: 519 PG/ML (ref 193–986)

## 2020-03-05 PROCEDURE — 80061 LIPID PANEL: CPT | Performed by: FAMILY MEDICINE

## 2020-03-05 PROCEDURE — 82746 ASSAY OF FOLIC ACID SERUM: CPT | Performed by: FAMILY MEDICINE

## 2020-03-05 PROCEDURE — 82607 VITAMIN B-12: CPT | Performed by: FAMILY MEDICINE

## 2020-03-05 PROCEDURE — 99214 OFFICE O/P EST MOD 30 MIN: CPT | Mod: 25 | Performed by: FAMILY MEDICINE

## 2020-03-05 PROCEDURE — 36415 COLL VENOUS BLD VENIPUNCTURE: CPT | Performed by: FAMILY MEDICINE

## 2020-03-05 PROCEDURE — 82306 VITAMIN D 25 HYDROXY: CPT | Performed by: FAMILY MEDICINE

## 2020-03-05 PROCEDURE — 80048 BASIC METABOLIC PNL TOTAL CA: CPT | Performed by: FAMILY MEDICINE

## 2020-03-05 PROCEDURE — 99396 PREV VISIT EST AGE 40-64: CPT | Performed by: FAMILY MEDICINE

## 2020-03-05 RX ORDER — MULTIVIT-MIN/IRON FUM/FOLIC AC 7.5 MG-4
1 TABLET ORAL DAILY
Qty: 100 TABLET | Refills: 3 | Status: SHIPPED | OUTPATIENT
Start: 2020-03-05 | End: 2022-04-05

## 2020-03-05 RX ORDER — AMLODIPINE BESYLATE 5 MG/1
5 TABLET ORAL DAILY
Qty: 90 TABLET | Refills: 3 | Status: SHIPPED | OUTPATIENT
Start: 2020-03-05 | End: 2021-05-17

## 2020-03-05 ASSESSMENT — ENCOUNTER SYMPTOMS
COUGH: 0
CONSTIPATION: 0
PALPITATIONS: 0
DIZZINESS: 0
EYE PAIN: 0
CHILLS: 0
NAUSEA: 0
NERVOUS/ANXIOUS: 0
HEARTBURN: 0
ABDOMINAL PAIN: 0
HEMATOCHEZIA: 0
HEADACHES: 0
FEVER: 0
DIARRHEA: 0
ARTHRALGIAS: 0
PARESTHESIAS: 0
SHORTNESS OF BREATH: 0
MYALGIAS: 0
HEMATURIA: 0
DYSURIA: 0
FREQUENCY: 0
WEAKNESS: 0
SORE THROAT: 0
JOINT SWELLING: 0

## 2020-03-05 ASSESSMENT — MIFFLIN-ST. JEOR: SCORE: 1690.99

## 2020-03-05 NOTE — PATIENT INSTRUCTIONS
Preventive Health Recommendations  Male Ages 40 to 49    Yearly exam:             See your health care provider every year in order to  o   Review health changes.   o   Discuss preventive care.    o   Review your medicines if your doctor has prescribed any.    You should be tested each year for STDs (sexually transmitted diseases) if you re at risk.     Have a cholesterol test every 5 years.     Have a colonoscopy (test for colon cancer) if someone in your family has had colon cancer or polyps before age 50.     After age 45, have a diabetes test (fasting glucose). If you are at risk for diabetes, you should have this test every 3 years.      Talk with your health care provider about whether or not a prostate cancer screening test (PSA) is right for you.    Shots: Get a flu shot each year. Get a tetanus shot every 10 years.     Nutrition:    Eat at least 5 servings of fruits and vegetables daily.     Eat whole-grain bread, whole-wheat pasta and brown rice instead of white grains and rice.     Get adequate Calcium and Vitamin D.     Lifestyle    Exercise for at least 150 minutes a week (30 minutes a day, 5 days a week). This will help you control your weight and prevent disease.     Limit alcohol to one drink per day.     No smoking.     Wear sunscreen to prevent skin cancer.     See your dentist every six months for an exam and cleaning.       I want you to start taking a multivitamin with minerals to help with your muscle twitching.   Schedule an appointment with neurology to follow-up on the muscle spasms, if you do not see improvements in one month.    Start taking the Vitamin D supplement daily.     I have renewed your prescription for the blood pressure tablet. Continue to take this as prescribed.

## 2020-03-05 NOTE — PROGRESS NOTES
SUBJECTIVE:   CC: Ephraim Rebolledo is an 39 year old male who presents for preventative health visit.     Healthy Habits:     Getting at least 3 servings of Calcium per day:  Yes    Bi-annual eye exam:  Yes    Dental care twice a year:  NO    Sleep apnea or symptoms of sleep apnea:  None    Diet:  Regular (no restrictions)    Frequency of exercise:  2-3 days/week    Duration of exercise:  30-45 minutes    Taking medications regularly:  Yes    Medication side effects:  None    PHQ-2 Total Score: 0    Additional concerns today:  Yes    NEUROLOGY  Patient reports experiencing muscular twitching. He has experienced twitching all over, including his face (near the eyes), left deltoid, and buttocks. Patient notes that he's experienced this for over 1 year now, and has been able to ignore the symptoms but is now concerned.       HEENT  Eye- patient reports that while reading his eye will jump to next word, skipping over other phrases.    Patient had seen an ophthalmologist last year, who reported that his eyes appeared normal. He was given some blue-light glasses for screen sensitivity. He has a follow-up visit in this coming month.     ENDOCRINE  Patient expressed interest in testing his Vitamin D level today.   A lab exam in 2012 reported that his Vitamin D level was less than 16 ug/L.   He notes that he stopped taking a supplement, although he was diagnosed with Vitamin D deficiency.       Hypertension Follow-up  Patient is currently taking Amlodipine, 5 mg daily. He is compliant and reports no negative side-effects.     Do you check your blood pressure regularly outside of the clinic? No     Are you following a low salt diet? Yes    Are your blood pressures ever more than 140 on the top number (systolic) OR more   than 90 on the bottom number (diastolic), for example 140/90? No    MIGRAINE  Patient reports taking Imitrex (25 mg) as needed for migraines. He denies any negative side-effects.     GI  Patient had rectal  bleeding in the past. He feels that it may have been brought on by his overuse of Ibuprofen to treat his migraines.         Today's PHQ-2 Score:   PHQ-2 ( 1999 Pfizer) 3/5/2020   Q1: Little interest or pleasure in doing things 0   Q2: Feeling down, depressed or hopeless 0   PHQ-2 Score 0   Q1: Little interest or pleasure in doing things Not at all   Q2: Feeling down, depressed or hopeless Not at all   PHQ-2 Score 0       Abuse: Current or Past(Physical, Sexual or Emotional)- NO  Do you feel safe in your environment? YES        Social History     Tobacco Use     Smoking status: Never Smoker     Smokeless tobacco: Never Used   Substance Use Topics     Alcohol use: Yes     Comment: occasionally, couple times a year     If you drink alcohol do you typically have >3 drinks per day or >7 drinks per week? No    Alcohol Use 3/5/2020   Prescreen: >3 drinks/day or >7 drinks/week? Not Applicable   Prescreen: >3 drinks/day or >7 drinks/week? -   No flowsheet data found.    Last PSA: No results found for: PSA    Reviewed orders with patient. Reviewed health maintenance and updated orders accordingly - Yes  Lab work is in process  Labs reviewed in EPIC  BP Readings from Last 3 Encounters:   03/05/20 130/76   06/14/19 122/84   04/24/19 (!) 141/103    Wt Readings from Last 3 Encounters:   03/05/20 82.7 kg (182 lb 6.4 oz)   04/24/19 82.6 kg (182 lb)   02/26/19 84.2 kg (185 lb 9.6 oz)            Patient Active Problem List   Diagnosis     CARDIOVASCULAR SCREENING; LDL GOAL LESS THAN 160     Vitamin D deficiency     HTN, goal below 140/90     Overweight (BMI 25.0-29.9)     Past Surgical History:   Procedure Laterality Date     NO HISTORY OF SURGERY         Social History     Tobacco Use     Smoking status: Never Smoker     Smokeless tobacco: Never Used   Substance Use Topics     Alcohol use: Yes     Comment: occasionally, couple times a year     Family History   Problem Relation Age of Onset     Hypertension Mother      Heart Disease  Brother         cardiomegaly         Current Outpatient Medications   Medication Sig Dispense Refill     amLODIPine (NORVASC) 5 MG tablet Take 1 tablet (5 mg) by mouth daily 90 tablet 3     cholecalciferol (DIALYVITE VITAMIN D 5000) 125 MCG (5000 UT) CAPS Take 1 capsule (5,000 Units) by mouth daily 100 capsule 3     multivitamins w/minerals tablet Take 1 tablet by mouth daily 100 tablet 3     SUMAtriptan (IMITREX) 25 MG tablet Take 1 tablet (25 mg) by mouth at onset of headache for migraine (only one tablet a day) 6 tablet 0     Reviewed and updated as needed this visit by clinical staff  Tobacco  Allergies  Soc Hx        Reviewed and updated as needed this visit by Provider        Past Medical History:   Diagnosis Date     Measles as Child      Past Surgical History:   Procedure Laterality Date     NO HISTORY OF SURGERY       OB History   No obstetric history on file.       Review of Systems   Constitutional: Negative for chills and fever.   HENT: Negative for congestion, ear pain, hearing loss and sore throat.    Eyes: Negative for pain and visual disturbance.   Respiratory: Negative for cough and shortness of breath.    Cardiovascular: Negative for chest pain, palpitations and peripheral edema.   Gastrointestinal: Negative for abdominal pain, constipation, diarrhea, heartburn, hematochezia and nausea.   Genitourinary: Negative for discharge, dysuria, frequency, genital sores, hematuria, impotence and urgency.   Musculoskeletal: Negative for arthralgias, joint swelling and myalgias.   Skin: Negative for rash.   Neurological: Negative for dizziness, weakness, headaches and paresthesias.   Psychiatric/Behavioral: Negative for mood changes. The patient is not nervous/anxious.      This document serves as a record of the services and decisions personally performed and made by Lauren Varner DO. It was created on her behalf by Carlos Fofana, a trained medical scribe. The creation of this document is based on the  "provider's statements to the medical scribe.  Carlos Fofana March 5, 2020 12:29 PM    OBJECTIVE:   /76 (BP Location: Right arm, Patient Position: Chair, Cuff Size: Adult Regular)   Pulse 66   Temp 99  F (37.2  C) (Oral)   Resp 16   Ht 1.702 m (5' 7\")   Wt 82.7 kg (182 lb 6.4 oz)   SpO2 97%   BMI 28.57 kg/m      Physical Exam  GENERAL: healthy, alert and no distress  EYES: Eyes grossly normal to inspection, PERRL and conjunctivae and sclerae normal  HENT: left ear canal and TM normal, nose and mouth without ulcers or lesions; right TM has cerumen blockage  NECK: no adenopathy, no asymmetry, masses, or scars and thyroid normal to palpation  RESP: lungs clear to auscultation - no rales, rhonchi or wheezes  CV: regular rate and rhythm, normal S1 S2, no S3 or S4, no murmur, click or rub, no peripheral edema and peripheral pulses strong  ABDOMEN: soft, nontender, no hepatosplenomegaly, no masses and bowel sounds normal  MS: no gross musculoskeletal defects noted, no edema  SKIN: no suspicious lesions or rashes  NEURO: Normal strength and tone, mentation intact and speech normal  PSYCH: mentation appears normal, affect normal/bright    Diagnostic Test Results:  Labs reviewed in Epic  No results found for this or any previous visit (from the past 24 hour(s)).    ASSESSMENT/PLAN:   (Z00.00) Routine general medical examination at a health care facility  (primary encounter diagnosis)  Comment: Health Maintenance    (I10) Benign essential hypertension  Comment: Patient has benign essential hypertension. He is compliant with medication and denies any negative side-effects.  Plan: amLODIPine (NORVASC) 5 MG tablet        I have renewed the Amlodipine prescription today. Patient is to take as instructed, monitor his condition, and schedule a follow-up visit in six months.     (E55.9) Vitamin D deficiency  Comment: Patient has a Vitamin D deficiency. His last lab showed that his levels were 16 micrograms/L. He has stopped " "taking supplements.   Plan: Vitamin D Deficiency, cholecalciferol         (DIALYVITE VITAMIN D 5000) 125 MCG (5000 UT)         CAPS        I have prescribed a Vitamin D supplement. The patient is to take as instructed and follow-up as needed.     (R25.3) Muscle twitching  Comment: Patient reports experiencing muscle twitching, ongoing for one year.   Plan: Vitamin B12, Folate, Basic metabolic panel,         NEUROLOGY ADULT REFERRAL, multivitamins         w/minerals tablet        Labs have been ordered. I will follow-up with the patient once the results are complete. I have prescribed a multivitamin with minerals to be taken daily. I have included a neurology referral. Patient is to schedule a follow-up with the specialist for long-term treatment, if he does not see improvement within one month.     (Z13.6) CARDIOVASCULAR SCREENING; LDL GOAL LESS THAN 160  Comment: Patient is due for a cardiovascular screening of his LDL levels.   Plan: Lipid panel reflex to direct LDL Non-fasting        Labs have been ordered. I will follow-up with the patient once the results are complete.      COUNSELING:   Reviewed preventive health counseling, as reflected in patient instructions       Regular exercise       Healthy diet/nutrition       Vision screening       Hearing screening       Colon cancer screening       Prostate cancer screening    Estimated body mass index is 28.57 kg/m  as calculated from the following:    Height as of this encounter: 1.702 m (5' 7\").    Weight as of this encounter: 82.7 kg (182 lb 6.4 oz).     Weight management plan: Discussed healthy diet and exercise guidelines     reports that he has never smoked. He has never used smokeless tobacco.      Counseling Resources:  ATP IV Guidelines  Pooled Cohorts Equation Calculator  FRAX Risk Assessment  ICSI Preventive Guidelines  Dietary Guidelines for Americans, 2010  USDA's MyPlate  ASA Prophylaxis  Lung CA Screening    The information in this document, created " by the medical scribe, Carlos Fofana, for me, accurately reflects the services I personally performed and the decisions made by me. I have reviewed and approved this document for accuracy prior to leaving the patient care area.    Lauren Varner,   [facility]

## 2020-03-06 LAB
ANION GAP SERPL CALCULATED.3IONS-SCNC: 5 MMOL/L (ref 3–14)
BUN SERPL-MCNC: 12 MG/DL (ref 7–30)
CALCIUM SERPL-MCNC: 9 MG/DL (ref 8.5–10.1)
CHLORIDE SERPL-SCNC: 105 MMOL/L (ref 94–109)
CHOLEST SERPL-MCNC: 171 MG/DL
CO2 SERPL-SCNC: 29 MMOL/L (ref 20–32)
CREAT SERPL-MCNC: 0.92 MG/DL (ref 0.66–1.25)
DEPRECATED CALCIDIOL+CALCIFEROL SERPL-MC: 11 UG/L (ref 20–75)
GFR SERPL CREATININE-BSD FRML MDRD: >90 ML/MIN/{1.73_M2}
GLUCOSE SERPL-MCNC: 77 MG/DL (ref 70–99)
HDLC SERPL-MCNC: 55 MG/DL
LDLC SERPL CALC-MCNC: 105 MG/DL
NONHDLC SERPL-MCNC: 116 MG/DL
POTASSIUM SERPL-SCNC: 3.7 MMOL/L (ref 3.4–5.3)
SODIUM SERPL-SCNC: 139 MMOL/L (ref 133–144)
TRIGL SERPL-MCNC: 55 MG/DL

## 2020-06-25 ENCOUNTER — TELEPHONE (OUTPATIENT)
Dept: OPTOMETRY | Facility: CLINIC | Age: 40
End: 2020-06-25

## 2020-07-13 ENCOUNTER — TELEPHONE (OUTPATIENT)
Dept: FAMILY MEDICINE | Facility: CLINIC | Age: 40
End: 2020-07-13

## 2020-07-13 DIAGNOSIS — E55.9 VITAMIN D DEFICIENCY: Primary | ICD-10-CM

## 2020-07-13 NOTE — TELEPHONE ENCOUNTER
Reason for Call:  Other     Detailed comments: Would like lab orders placed to check Vitamin D levels he has completed the medication.    Phone Number Patient can be reached at: Home number on file [phone] (home)    Best Time:     Can we leave a detailed message on this number? YES    Call taken on 7/13/2020 at 12:43 PM by Cony Velasquez

## 2020-07-13 NOTE — TELEPHONE ENCOUNTER
Routing to PCP to please advise.    Patient states therapy completed but appears she should be on this for the year?    Last Vit D lab last done 3/5/20-  11 ug/L low    Lab pended if agreeable         Jennifer Reinoso RN

## 2020-07-13 NOTE — TELEPHONE ENCOUNTER
Flagging for MA to please reach out to patient to inform patient Vit D lab ordered and he can make a lab only appointment to schedule.      Jennifer Reinoso RN

## 2020-07-16 NOTE — TELEPHONE ENCOUNTER
[employer] message has not been read. Spoke to patient over the phone. 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  Sia Wu CMA ([facility])

## 2020-08-05 ENCOUNTER — OFFICE VISIT (OUTPATIENT)
Dept: OPTOMETRY | Facility: CLINIC | Age: 40
End: 2020-08-05
Payer: COMMERCIAL

## 2020-08-05 DIAGNOSIS — H52.4 PRESBYOPIA: ICD-10-CM

## 2020-08-05 DIAGNOSIS — Z01.01 ENCOUNTER FOR EXAMINATION OF EYES AND VISION WITH ABNORMAL FINDINGS: Primary | ICD-10-CM

## 2020-08-05 DIAGNOSIS — H52.223 REGULAR ASTIGMATISM OF BOTH EYES: ICD-10-CM

## 2020-08-05 DIAGNOSIS — H11.003 PTERYGIUM OF BOTH EYES: ICD-10-CM

## 2020-08-05 DIAGNOSIS — H52.13 MYOPIA OF BOTH EYES: ICD-10-CM

## 2020-08-05 PROCEDURE — 92015 DETERMINE REFRACTIVE STATE: CPT | Performed by: OPTOMETRIST

## 2020-08-05 PROCEDURE — 92004 COMPRE OPH EXAM NEW PT 1/>: CPT | Performed by: OPTOMETRIST

## 2020-08-05 ASSESSMENT — REFRACTION_MANIFEST
METHOD_AUTOREFRACTION: 1
OS_AXIS: 105
OS_SPHERE: -0.75
OD_SPHERE: -2.00
OS_CYLINDER: +0.50
OD_AXIS: 054
OS_AXIS: 098
OD_SPHERE: -1.00
OD_ADD: +1.25
OD_CYLINDER: +0.75
OS_SPHERE: -1.00
OD_AXIS: 047
OS_ADD: +1.25
OS_CYLINDER: +0.50
OD_CYLINDER: +3.75

## 2020-08-05 ASSESSMENT — VISUAL ACUITY
OD_SC: 20/50
OS_SC: 20/20
OD_SC: 20/25
METHOD: SNELLEN - LINEAR
OD_SC+: -1
OS_SC+: -1
OS_SC: 20/40

## 2020-08-05 ASSESSMENT — CONF VISUAL FIELD
OD_NORMAL: 1
OS_NORMAL: 1
METHOD: COUNTING FINGERS

## 2020-08-05 ASSESSMENT — CUP TO DISC RATIO
OS_RATIO: 0.35
OD_RATIO: 0.3

## 2020-08-05 ASSESSMENT — SLIT LAMP EXAM - LIDS
COMMENTS: NORMAL
COMMENTS: NORMAL

## 2020-08-05 ASSESSMENT — EXTERNAL EXAM - RIGHT EYE: OD_EXAM: NORMAL

## 2020-08-05 ASSESSMENT — EXTERNAL EXAM - LEFT EYE: OS_EXAM: NORMAL

## 2020-08-05 ASSESSMENT — TONOMETRY
IOP_METHOD: APPLANATION
OS_IOP_MMHG: 14
OD_IOP_MMHG: 12

## 2020-08-05 NOTE — PATIENT INSTRUCTIONS
"Dry eye can actually cause your eyes to feel \"teary\", because the lacrimal system of the eye overcompensates for eye dryness and caused the eyes to tear excessively. I recommend using artificial tears before you start reading to help prevent the eyes from drying out. There are over the counter drops that work well (Systane , Refresh, Thera Tears)- avoid \"get the red out\" drops.     Ephraim was advised of today's exam findings.  Fill glasses prescription  Allow 2 weeks to adapt to change in glasses  Discussed purpose of bifocal to help with both distance and reading vision.   Copy of glasses Rx provided today.    Return in 1-2 years for eye exam, or sooner if needed.    The effects of the dilating drops last for 4- 6 hours.  You will be more sensitive to light and vision will be blurry up close.  Mydriatic sunglasses were given if needed.      Tulio Rdz O.D.  [facility]  [address] [place] [address]. EFREN Phan  [phone]    [phone]    "

## 2020-08-05 NOTE — LETTER
"    8/5/2020         RE: Ephraim Rebolledo  [address] [place] [address]        Dear Colleague,    Thank you for referring your patient, Ephraim Rebolledo, to the [facility]. Please see a copy of my visit note below.    Chief Complaint   Patient presents with     Annual Eye Exam         Last Eye Exam: 1-2 years ago  Dilated Previously: Yes    What are you currently using to see?  Glasses for distance - Patient did not bring glasses to this visit.  Glasses are used for driving and classroom       Distance Vision Acuity: Satisfied with vision    Near Vision Acuity: Not satisfied .  Eyes are tearing and eyes hurt.  Eyes feel strained if reading for long period of time.    Eye Comfort: teary, pain, strain  Do you use eye drops? : No  Occupation or Hobbies: student and phlebotomist at [employer]       Medical, surgical and family histories reviewed and updated 8/5/2020.       OBJECTIVE: See Ophthalmology exam    ASSESSMENT:    ICD-10-CM    1. Encounter for examination of eyes and vision with abnormal findings  Z01.01    2. Pterygium of both eyes  H11.003    3. Myopia of both eyes  H52.13    4. Regular astigmatism of both eyes  H52.223    5. Presbyopia  H52.4       PLAN:     Patient Instructions   Dry eye can actually cause your eyes to feel \"teary\", because the lacrimal system of the eye overcompensates for eye dryness and caused the eyes to tear excessively. I recommend using artificial tears before you start reading to help prevent the eyes from drying out. There are over the counter drops that work well (Systane , Refresh, Thera Tears)- avoid \"get the red out\" drops.     Ephraim was advised of today's exam findings.  Fill glasses prescription  Allow 2 weeks to adapt to change in glasses  Discussed purpose of bifocal to help with both distance and reading vision.   Copy of glasses Rx provided today.    Return in 1-2 years for eye exam, or sooner if needed.    The " effects of the dilating drops last for 4- 6 hours.  You will be more sensitive to light and vision will be blurry up close.  Mydriatic sunglasses were given if needed.      Tulio Rdz O.D.  [address] [place]  [address]    [phone]           Again, thank you for allowing me to participate in the care of your patient.        Sincerely,        Tulio Rdz OD

## 2020-08-05 NOTE — PROGRESS NOTES
"Chief Complaint   Patient presents with     Annual Eye Exam         Last Eye Exam: 1-2 years ago  Dilated Previously: Yes    What are you currently using to see?  Glasses for distance - Patient did not bring glasses to this visit.  Glasses are used for driving and classroom       Distance Vision Acuity: Satisfied with vision    Near Vision Acuity: Not satisfied .  Eyes are tearing and eyes hurt.  Eyes feel strained if reading for long period of time.    Eye Comfort: teary, pain, strain  Do you use eye drops? : No  Occupation or Hobbies: student and phlebotomist at [employer]       Medical, surgical and family histories reviewed and updated 8/5/2020.       OBJECTIVE: See Ophthalmology exam    ASSESSMENT:    ICD-10-CM    1. Encounter for examination of eyes and vision with abnormal findings  Z01.01    2. Pterygium of both eyes  H11.003    3. Myopia of both eyes  H52.13    4. Regular astigmatism of both eyes  H52.223    5. Presbyopia  H52.4       PLAN:     Patient Instructions   Dry eye can actually cause your eyes to feel \"teary\", because the lacrimal system of the eye overcompensates for eye dryness and caused the eyes to tear excessively. I recommend using artificial tears before you start reading to help prevent the eyes from drying out. There are over the counter drops that work well (Systane , Refresh, Thera Tears)- avoid \"get the red out\" drops.     Ephraim was advised of today's exam findings.  Fill glasses prescription  Allow 2 weeks to adapt to change in glasses  Discussed purpose of bifocal to help with both distance and reading vision.   Copy of glasses Rx provided today.    Return in 1-2 years for eye exam, or sooner if needed.    The effects of the dilating drops last for 4- 6 hours.  You will be more sensitive to light and vision will be blurry up close.  Mydriatic sunglasses were given if needed.      Tulio Rdz O.D.  [address] " [place]. [place]  [address]    [phone]

## 2020-08-19 ENCOUNTER — ALLIED HEALTH/NURSE VISIT (OUTPATIENT)
Dept: NURSING | Facility: CLINIC | Age: 40
End: 2020-08-19
Payer: COMMERCIAL

## 2020-08-19 DIAGNOSIS — Z11.1 SCREENING EXAMINATION FOR PULMONARY TUBERCULOSIS: Primary | ICD-10-CM

## 2020-08-19 PROCEDURE — 86580 TB INTRADERMAL TEST: CPT

## 2020-08-19 PROCEDURE — 99207 ZZC NO CHARGE NURSE ONLY: CPT

## 2020-08-19 NOTE — PROGRESS NOTES
The patient is asked the following questions today and these are his answers:    -Have you had a mantoux administered in the past 30 days?    No  -Have you had a previous positive Mantoux.  No  -Have you received BCG in the past.  No  -Have you had a live vaccine  (MMR, Varicella, OPV, Yellow Fever) in the last 6 weeks.  No  -Have you had and active  viral or bacterial infection in the past 6 weeks.  No  -Have you received corticosteroids or immunosuppressive agents in the past 6 weeks.  No  -Have you been diagnosed with HIV?  No  -Do you have a malignancy?  No    Mantoux Questionnaire: answers were all negative.    Fidelia Montero MA

## 2020-08-21 ENCOUNTER — ALLIED HEALTH/NURSE VISIT (OUTPATIENT)
Dept: NURSING | Facility: CLINIC | Age: 40
End: 2020-08-21
Payer: COMMERCIAL

## 2020-08-21 DIAGNOSIS — E55.9 VITAMIN D DEFICIENCY: ICD-10-CM

## 2020-08-21 DIAGNOSIS — Z11.1 ENCOUNTER FOR READING OF TUBERCULIN SKIN TEST: Primary | ICD-10-CM

## 2020-08-21 LAB
PPDINDURATION: NORMAL MM (ref 0–5)
PPDREDNESS: NORMAL MM

## 2020-08-21 PROCEDURE — 99207 ZZC NO CHARGE NURSE ONLY: CPT

## 2020-08-21 PROCEDURE — 36415 COLL VENOUS BLD VENIPUNCTURE: CPT | Performed by: FAMILY MEDICINE

## 2020-08-21 PROCEDURE — 82306 VITAMIN D 25 HYDROXY: CPT | Performed by: FAMILY MEDICINE

## 2020-08-21 NOTE — PROGRESS NOTES
Patient arrives to have mantoux read.  Placed on 8/19/20 at 2:02 pm    Mantoux results:   No induration.  No swelling.  No redness.    Jennifer Reinoso RN

## 2020-08-23 LAB — DEPRECATED CALCIDIOL+CALCIFEROL SERPL-MC: 52 UG/L (ref 20–75)

## 2020-09-02 ENCOUNTER — TELEPHONE (OUTPATIENT)
Dept: FAMILY MEDICINE | Facility: CLINIC | Age: 40
End: 2020-09-02

## 2020-09-02 DIAGNOSIS — K62.5 RECTAL BLEEDING: Primary | ICD-10-CM

## 2020-09-02 NOTE — TELEPHONE ENCOUNTER
Nurse sees referral GI rectal bleeding 6/14/2019      Routing to PCP to review and advise.    Manuela Ribeiro RN  [facility]

## 2020-09-02 NOTE — TELEPHONE ENCOUNTER
Reason for Call: Request for an order or referral:    Order or referral being requested: Colonoscopy    Date needed: as soon as possible    Has the patient been seen by the PCP for this problem? YES    Additional comments: Patient called and stated he had a referral put in for a colonoscopy, however, with the COVID19 situation he was not able to schedule the appointment.  Patient is requesting a new referral put in as soon as possible.    Phone number Patient can be reached at:  Home number on file [phone] (home)    Best Time:  ASAP    Can we leave a detailed message on this number?  YES    Call taken on 9/2/2020 at 5:23 PM by Radha Morin

## 2020-09-03 NOTE — TELEPHONE ENCOUNTER
Called patient and informed that referral was placed and he can call [phone].    Krishna Naik

## 2020-09-18 DIAGNOSIS — Z11.59 ENCOUNTER FOR SCREENING FOR OTHER VIRAL DISEASES: Primary | ICD-10-CM

## 2020-09-25 ENCOUNTER — TELEPHONE (OUTPATIENT)
Dept: GASTROENTEROLOGY | Facility: CLINIC | Age: 40
End: 2020-09-25

## 2020-09-28 ENCOUNTER — TELEPHONE (OUTPATIENT)
Dept: GASTROENTEROLOGY | Facility: CLINIC | Age: 40
End: 2020-09-28

## 2020-09-28 DIAGNOSIS — Z11.59 ENCOUNTER FOR SCREENING FOR OTHER VIRAL DISEASES: ICD-10-CM

## 2020-09-28 DIAGNOSIS — K62.5 RECTAL BLEEDING: Primary | ICD-10-CM

## 2020-09-28 PROCEDURE — U0003 INFECTIOUS AGENT DETECTION BY NUCLEIC ACID (DNA OR RNA); SEVERE ACUTE RESPIRATORY SYNDROME CORONAVIRUS 2 (SARS-COV-2) (CORONAVIRUS DISEASE [COVID-19]), AMPLIFIED PROBE TECHNIQUE, MAKING USE OF HIGH THROUGHPUT TECHNOLOGIES AS DESCRIBED BY CMS-2020-01-R: HCPCS | Performed by: INTERNAL MEDICINE

## 2020-09-28 RX ORDER — BISACODYL 5 MG
5 TABLET, DELAYED RELEASE (ENTERIC COATED) ORAL SEE ADMIN INSTRUCTIONS
Qty: 4 TABLET | Refills: 0 | Status: SHIPPED | OUTPATIENT
Start: 2020-09-28 | End: 2021-05-17

## 2020-09-29 LAB
SARS-COV-2 RNA SPEC QL NAA+PROBE: NOT DETECTED
SPECIMEN SOURCE: NORMAL

## 2020-10-01 ENCOUNTER — HOSPITAL ENCOUNTER (OUTPATIENT)
Facility: AMBULATORY SURGERY CENTER | Age: 40
Discharge: HOME | End: 2020-10-01
Attending: INTERNAL MEDICINE | Admitting: INTERNAL MEDICINE
Payer: COMMERCIAL

## 2020-10-01 ENCOUNTER — SURGERY (OUTPATIENT)
Age: 40
End: 2020-10-01
Payer: COMMERCIAL

## 2020-10-01 VITALS
HEIGHT: 67 IN | OXYGEN SATURATION: 100 % | BODY MASS INDEX: 28.25 KG/M2 | SYSTOLIC BLOOD PRESSURE: 121 MMHG | WEIGHT: 180 LBS | DIASTOLIC BLOOD PRESSURE: 84 MMHG | HEART RATE: 68 BPM | RESPIRATION RATE: 16 BRPM | TEMPERATURE: 97.1 F

## 2020-10-01 LAB — COLONOSCOPY: NORMAL

## 2020-10-01 PROCEDURE — 45380 COLONOSCOPY AND BIOPSY: CPT

## 2020-10-01 PROCEDURE — 45380 COLONOSCOPY AND BIOPSY: CPT | Performed by: INTERNAL MEDICINE

## 2020-10-01 PROCEDURE — 88305 TISSUE EXAM BY PATHOLOGIST: CPT | Mod: GC | Performed by: PATHOLOGY

## 2020-10-01 RX ORDER — FENTANYL CITRATE 50 UG/ML
INJECTION, SOLUTION INTRAMUSCULAR; INTRAVENOUS PRN
Status: DISCONTINUED | OUTPATIENT
Start: 2020-10-01 | End: 2020-10-01 | Stop reason: HOSPADM

## 2020-10-01 RX ORDER — SIMETHICONE
LIQUID (ML) MISCELLANEOUS PRN
Status: DISCONTINUED | OUTPATIENT
Start: 2020-10-01 | End: 2020-10-01 | Stop reason: HOSPADM

## 2020-10-01 RX ORDER — LIDOCAINE 40 MG/G
CREAM TOPICAL
Status: DISCONTINUED | OUTPATIENT
Start: 2020-10-01 | End: 2020-10-02 | Stop reason: HOSPADM

## 2020-10-01 RX ORDER — WATER 10 ML/10ML
INJECTION INTRAMUSCULAR; INTRAVENOUS; SUBCUTANEOUS PRN
Status: DISCONTINUED | OUTPATIENT
Start: 2020-10-01 | End: 2020-10-01 | Stop reason: HOSPADM

## 2020-10-01 RX ORDER — ONDANSETRON 2 MG/ML
4 INJECTION INTRAMUSCULAR; INTRAVENOUS
Status: DISCONTINUED | OUTPATIENT
Start: 2020-10-01 | End: 2020-10-02 | Stop reason: HOSPADM

## 2020-10-01 RX ADMIN — FENTANYL CITRATE 50 MCG: 50 INJECTION, SOLUTION INTRAMUSCULAR; INTRAVENOUS at 10:58

## 2020-10-01 RX ADMIN — Medication 3 ML: at 10:55

## 2020-10-01 RX ADMIN — WATER 3 ML: 10 INJECTION INTRAMUSCULAR; INTRAVENOUS; SUBCUTANEOUS at 10:58

## 2020-10-01 ASSESSMENT — MIFFLIN-ST. JEOR: SCORE: 1680.1

## 2020-10-02 LAB — COPATH REPORT: NORMAL

## 2020-12-06 ENCOUNTER — HEALTH MAINTENANCE LETTER (OUTPATIENT)
Age: 40
End: 2020-12-06

## 2021-04-11 ENCOUNTER — HEALTH MAINTENANCE LETTER (OUTPATIENT)
Age: 41
End: 2021-04-11

## 2021-05-06 ENCOUNTER — TELEPHONE (OUTPATIENT)
Dept: FAMILY MEDICINE | Facility: CLINIC | Age: 41
End: 2021-05-06

## 2021-05-06 NOTE — TELEPHONE ENCOUNTER
RN received call from patient.    Patient requested referral for vasectomy with Dr. Kapadia.  Patient had sent a request thru My Chart.    RN noted that patient has appointment for 5-17 with Dr. Varner to discuss.  Patient advised of appointment.    Patient verbalized understanding.    RN also  left VM for patient to confirm the appointment dated is  5-17.    Beka Clay RN, BSN, PHN  [facility]

## 2021-05-17 ENCOUNTER — OFFICE VISIT (OUTPATIENT)
Dept: FAMILY MEDICINE | Facility: CLINIC | Age: 41
End: 2021-05-17
Payer: COMMERCIAL

## 2021-05-17 VITALS
WEIGHT: 183.6 LBS | TEMPERATURE: 98.6 F | OXYGEN SATURATION: 100 % | SYSTOLIC BLOOD PRESSURE: 132 MMHG | DIASTOLIC BLOOD PRESSURE: 82 MMHG | HEART RATE: 76 BPM | RESPIRATION RATE: 16 BRPM | BODY MASS INDEX: 28.82 KG/M2 | HEIGHT: 67 IN

## 2021-05-17 DIAGNOSIS — Z30.09 ENCOUNTER FOR VASECTOMY COUNSELING: ICD-10-CM

## 2021-05-17 DIAGNOSIS — G43.001 MIGRAINE WITHOUT AURA AND WITH STATUS MIGRAINOSUS, NOT INTRACTABLE: ICD-10-CM

## 2021-05-17 DIAGNOSIS — Z11.59 NEED FOR HEPATITIS C SCREENING TEST: ICD-10-CM

## 2021-05-17 DIAGNOSIS — I10 BENIGN ESSENTIAL HYPERTENSION: Primary | ICD-10-CM

## 2021-05-17 LAB
ANION GAP SERPL CALCULATED.3IONS-SCNC: 5 MMOL/L (ref 3–14)
BUN SERPL-MCNC: 11 MG/DL (ref 7–30)
CALCIUM SERPL-MCNC: 9.2 MG/DL (ref 8.5–10.1)
CHLORIDE SERPL-SCNC: 104 MMOL/L (ref 94–109)
CO2 SERPL-SCNC: 30 MMOL/L (ref 20–32)
CREAT SERPL-MCNC: 0.92 MG/DL (ref 0.66–1.25)
GFR SERPL CREATININE-BSD FRML MDRD: >90 ML/MIN/{1.73_M2}
GLUCOSE SERPL-MCNC: 89 MG/DL (ref 70–99)
HCV AB SERPL QL IA: NONREACTIVE
POTASSIUM SERPL-SCNC: 3.3 MMOL/L (ref 3.4–5.3)
SODIUM SERPL-SCNC: 139 MMOL/L (ref 133–144)

## 2021-05-17 PROCEDURE — 80048 BASIC METABOLIC PNL TOTAL CA: CPT | Performed by: FAMILY MEDICINE

## 2021-05-17 PROCEDURE — 99214 OFFICE O/P EST MOD 30 MIN: CPT | Performed by: FAMILY MEDICINE

## 2021-05-17 PROCEDURE — 86803 HEPATITIS C AB TEST: CPT | Performed by: FAMILY MEDICINE

## 2021-05-17 PROCEDURE — 36415 COLL VENOUS BLD VENIPUNCTURE: CPT | Performed by: FAMILY MEDICINE

## 2021-05-17 RX ORDER — SUMATRIPTAN 25 MG/1
25 TABLET, FILM COATED ORAL
Qty: 6 TABLET | Refills: 11 | Status: SHIPPED | OUTPATIENT
Start: 2021-05-17 | End: 2022-02-28

## 2021-05-17 RX ORDER — AMLODIPINE BESYLATE 5 MG/1
5 TABLET ORAL DAILY
Qty: 90 TABLET | Refills: 1 | Status: SHIPPED | OUTPATIENT
Start: 2021-05-17 | End: 2022-02-07

## 2021-05-17 ASSESSMENT — ANXIETY QUESTIONNAIRES
GAD7 TOTAL SCORE: 1
1. FEELING NERVOUS, ANXIOUS, OR ON EDGE: SEVERAL DAYS
6. BECOMING EASILY ANNOYED OR IRRITABLE: NOT AT ALL
7. FEELING AFRAID AS IF SOMETHING AWFUL MIGHT HAPPEN: NOT AT ALL
IF YOU CHECKED OFF ANY PROBLEMS ON THIS QUESTIONNAIRE, HOW DIFFICULT HAVE THESE PROBLEMS MADE IT FOR YOU TO DO YOUR WORK, TAKE CARE OF THINGS AT HOME, OR GET ALONG WITH OTHER PEOPLE: NOT DIFFICULT AT ALL
5. BEING SO RESTLESS THAT IT IS HARD TO SIT STILL: NOT AT ALL
3. WORRYING TOO MUCH ABOUT DIFFERENT THINGS: NOT AT ALL
2. NOT BEING ABLE TO STOP OR CONTROL WORRYING: NOT AT ALL

## 2021-05-17 ASSESSMENT — PAIN SCALES - GENERAL: PAINLEVEL: MODERATE PAIN (4)

## 2021-05-17 ASSESSMENT — MIFFLIN-ST. JEOR: SCORE: 1691.43

## 2021-05-17 ASSESSMENT — PATIENT HEALTH QUESTIONNAIRE - PHQ9
SUM OF ALL RESPONSES TO PHQ QUESTIONS 1-9: 3
5. POOR APPETITE OR OVEREATING: NOT AT ALL

## 2021-05-17 NOTE — PATIENT INSTRUCTIONS
Patient Education     About Migraine Headaches  What is a migraine headache?  A migraine is a special kind of headache. It can last a for hours or days. It may cause intense pain. You may also feel sick to your stomach or have eye problems.  What causes it?  We don't know the exact cause. They may be caused by a problem with blood flow to the brain. Or they may happen when brain chemicals don't stay balanced. You are more likely to get a migraine when:    You are under stress    You are tired    You eat some kinds of foods, such as wine, cheese, chocolate or chemicals added to foods    You are around bright lights  Women are more likely to have migraines than men. Sometimes the headaches happen around the time a woman has her period. Or they may happen when a woman is taking hormone pills.   Migraines can run in families.  What are symptoms?  Before a migraine, you may:    Not feel well    Lose part of your vision    See bright spots    See zigzags in front of your eyes  Most of the time, these problems go away when the headache starts. When you have a migraine, you may:    Have a headache that throbs or pounds (you may feel the pain more on one side of your head, or your whole head may hurt)    Be very sensitive to light    Have blurry vision    Vomit (throw up) or have nausea (feel sick to your stomach)    Have numbness or tingling in your face or arm  How is it diagnosed?  Your care team will ask you about your symptoms and medical history. They will examine you.   It may help to keep a headache diary. You should write down:    The date and time of each headache    How long it lasts    The type of pain (is it dull or sharp? Does it throb? Do you feel pressure?)    Where it hurts (for example, scalp, eyes, temples, neck)    What symptoms you had before the headache started    What you ate and drank before the headache    If you used cigarettes, caffeine, alcohol or soda    What time you went to bed the night  before, and what time you woke up that day    If you are a woman, you should also note if you are using birth control pills or taking other female hormones  If you just recently started having headaches, your care team may suggest tests to look for the causes of your symptoms. You may need a brain scan or MRI.  How is it treated?    You may need to take medicines to keep migraines from getting worse once they start. Take these medicines as soon as you can when you start to have signs of a migraine.    You may need to take another medicine every day to stop migraines from coming so often. The medicine can help prevent very bad migraines.    You may need to try a medicine for a few weeks to see if it works. Be sure to keep your follow-up appointments with your care team to see if a medicine is working and what you should try next. Talk to your care team about what is best for you. You may have many choices.  How long will it last?  The headache may last from a few hours to a few days. You may get migraines for the rest of your life. Most of the time, migraines happen less often as you get older.  How can I take care of myself?  As soon as the migraine starts:    Take a pain reliever. Ask your care team what medicine you should take.    Rest in a quiet, dark room until you start to feel better.    Don't drive a car while you have a migraine.    See your care team if your headaches get worse or if they don't get better when you take medicine for them. It may take a few visits to find the best way to control your headaches.  How can I prevent migraines?    Eat regular, healthy meals. Don't go too long without eating. Stay away from foods that seem to cause headaches. Watch out for:  ? Wine, carlie and beer  ? Cheese  ? Aged, canned and processed meats  ? Bread made with yeast  ? Foods with cheese, chocolate or nuts    Don't use medicines that can cause headaches. Ask your care team about this. You may need to stop using  birth control or hormone pills.    Don't smoke cigarettes    Get plenty of sleep every day    Lower your stress. Find time to relax, rest and have fun in your life.  When should I call my care team?  Call your care team right away if you have symptoms that you don't usually get with migraines or you have other unusual symptoms, such as:    Problems talking or your speech is slurred    A weak arm or leg that you can't move in a normal way    A fever higher than 100 F (37.8 C)    Stiff neck    Vomiting that lasts for a few hours  For more information  National Headache Foundation (NHF)  www.headaches.org.  For informational purposes only. Not to replace the advice of your health care provider.   Copyright   2011 [employer]. All rights reserved. Clinically reviewed by [facility]. [employer] 256347 - 08/18.

## 2021-05-17 NOTE — PROGRESS NOTES
"    Assessment & Plan     Benign essential hypertension  The current medical regimen is effective;  continue present plan and medications.    - amLODIPine (NORVASC) 5 MG tablet; Take 1 tablet (5 mg) by mouth daily  - Basic metabolic panel  (Ca, Cl, CO2, Creat, Gluc, K, Na, BUN)    Migraine without aura and with status migrainosus, not intractable  I have given him the patient a list of migraine triggers.  He thinks that his migraines returned because of alcohol he tried.  We will have him use Imitrex as needed.  If he is not seeing improvement he may want to start magnesium daily  - SUMAtriptan (IMITREX) 25 MG tablet; Take 1 tablet (25 mg) by mouth at onset of headache for migraine (only one tablet a day)    Encounter for vasectomy counseling  The patient is happy to see any urologist and would like to go to [place] for this  - UROLOGY ADULT REFERRAL; Future    Need for hepatitis C screening test    - Hepatitis C Screen Reflex to HCV RNA Quant and Genotype    30 minutes spent on the date of the encounter doing chart review, patient visit and documentation        BMI:   Estimated body mass index is 28.76 kg/m  as calculated from the following:    Height as of this encounter: 1.702 m (5' 7\").    Weight as of this encounter: 83.3 kg (183 lb 9.6 oz).           Return in about 6 months (around 11/17/2021) for BP Recheck.    Lauren Varner DO  [facility]    Hung Singh is a 40 year old who presents for the following health issues     HPI     -Referral for Vasectomy- Dr. Darrell Mckeon    Hypertension Follow-up      Do you check your blood pressure regularly outside of the clinic? No     Are you following a low salt diet? No    Are your blood pressures ever more than 140 on the top number (systolic) OR more   than 90 on the bottom number (diastolic), for example 140/90? No   Norvasc 5 mg daily    Migraine     Since your last clinic visit, how have your headaches changed?  Worsened    How often " "are you getting headaches or migraines? Just started again recently     Are you able to do normal daily activities when you have a migraine? Yes    Are you taking rescue/relief medications? (Select all that apply) No- had stopped medication because he wasn't getting any migraines- would like refill    How helpful is your rescue/relief medication?  I get some relief Imitrex    Are you taking any medications to prevent migraines? (Select all that apply)  No    In the past 4 weeks, how often have you gone to urgent care or the emergency room because of your headaches?  0        How many servings of fruits and vegetables do you eat daily?  2-3    On average, how many sweetened beverages do you drink each day (Examples: soda, juice, sweet tea, etc.  Do NOT count diet or artificially sweetened beverages)?   0    How many days per week do you exercise enough to make your heart beat faster? 3 or less    How many minutes a day do you exercise enough to make your heart beat faster? 9 or less    How many days per week do you miss taking your medication? 0          Review of Systems   Constitutional, HEENT, cardiovascular, pulmonary, gi and gu systems are negative, except as otherwise noted.      Objective    /82   Pulse 76   Temp 98.6  F (37  C) (Oral)   Resp 16   Ht 1.702 m (5' 7\")   Wt 83.3 kg (183 lb 9.6 oz)   SpO2 100%   BMI 28.76 kg/m    Body mass index is 28.76 kg/m .  Physical Exam   GENERAL: healthy, alert and no distress  NECK: no adenopathy, no asymmetry, masses, or scars and thyroid normal to palpation  RESP: lungs clear to auscultation - no rales, rhonchi or wheezes  CV: regular rate and rhythm, normal S1 S2, no S3 or S4, no murmur, click or rub, no peripheral edema and peripheral pulses strong  MS: no gross musculoskeletal defects noted, no edema  PSYCH: mentation appears normal, affect normal/bright        "

## 2021-05-18 ENCOUNTER — VIRTUAL VISIT (OUTPATIENT)
Dept: UROLOGY | Facility: CLINIC | Age: 41
End: 2021-05-18
Payer: COMMERCIAL

## 2021-05-18 DIAGNOSIS — Z30.09 VASECTOMY EVALUATION: Primary | ICD-10-CM

## 2021-05-18 PROCEDURE — 99202 OFFICE O/P NEW SF 15 MIN: CPT | Mod: TEL | Performed by: UROLOGY

## 2021-05-18 ASSESSMENT — ANXIETY QUESTIONNAIRES: GAD7 TOTAL SCORE: 1

## 2021-05-18 NOTE — PROGRESS NOTES
Francisco is a 40 year old who is being evaluated via a billable telephone visit.      What phone number would you like to be contacted at? [phone]  How would you like to obtain your AVS? Torodevaughnandriy Singh is a 40 year old who presents for the following health issues vas evaluation    HPI     Patient is interested in vasectomy.  He has 5 children.    Review of Systems   Constitutional, HEENT, cardiovascular, pulmonary, gi and gu systems are negative, except as otherwise noted.      Objective             Physical Exam   healthy, alert and no distress  PSYCH: Alert and oriented times 3; coherent speech, normal   rate and volume, able to articulate logical thoughts, able   to abstract reason, no tangential thoughts, no hallucinations   or delusions  His affect is normal  RESP: No cough, no audible wheezing, able to talk in full sentences  Remainder of exam unable to be completed due to telephone visits    Vasectomy discussed.  Risks of bleeding/infection/failure rate/chronic testicular pain discussed.  Patient understands need for protection until neg semen tests.            Phone call duration: 6 minutes

## 2021-05-19 NOTE — PATIENT INSTRUCTIONS
[facility]  [address]   [place] [address]    Your Vasectomy is scheduled 7/6/2021 9:30 arrive at 9:20.  Please call [phone] if you need to reschedule this appointment or if you have any questions.     Preparation for Vasectomy:  Shave the hair away from the base of your penis and around your testicles.  Wear snug underwear the day of the vasectomy to support your testicles.  Do not take aspirin, ibuprofen, advil, motrin, aleve products one week prior to your vasectomy.        General Vasectomy Information    Vasectomy is a surgery.  If it is successful, it will be impossible for you to ever father children.  The following information regards the male sterilization done by an operation called a vasectomy.  This is done in the physician's office.    The operation done to sterilize the male is easier, safer and much less expensive than the operation done to sterilize the woman.    Sterilization should be considered permanent.  For most males, once the operation is done, it can never me undone.  There are attempts made occasionally to reconnect the tubes that have been cut during the procedure, but this is very difficult and expensive and works only about 50-70% of the time.  In order for any of the physicians in our clinic to do a vasectomy, we require that you consider this a permanent form a sterilization.    A vasectomy can be done at any time, but it is best to think about having it done when you can take at least one day off from work and any excess activities.    Your decision to have a vasectomy should only be made with the following facts clear in mind.    1. First, a vasectomy is only one of several means of birth control.  Many form of temporary contraception are available.  If you have any questions about other methods, please discuss this with your physician.    2. A vasectomy may be unsuccessful in approximately one out of 1000 couples per year.  This occurs when the tubes  which are cut during the procedure reconnect themselves.  Sterility cannot be guaranteed.    3. You should be aware that it is the current belief of the medical profession that a vasectomy procedure does not alter a male physically, physiologically or sexually.  Because each person is a unique individual, there is always the possibility of an adverse phychiatric reaction.  This can be best avoided by being very comfortable in your own mind that you want to have this done, and that you do not want to father any children in the future.  If this is not clear in your mind, this should be further discussed with your physician.    4. You will not notice a change in the volume of your ejaculate since sperm is a very small amount of the semen and it is only the sperm that is stopped from entering the ejaculate after a vasectomy.  Your prostate and seminal vesicle glands really supply most of the semen and this is not at all decreased after a vasectomy.  Also there is no effect on the male hormones.    5. You do not become sterile immediately following a vasectomy due to the fact that there is still sperm remaining in your system that must be eliminated by ejaculation.  For this reason, your sexual partner could still become pregnant for a period of time following the vasectomy operation.  It is necessary that contraceptive measures be used until you receive confirmation from your physician that you are sterile.  It takes approximately 12 ejaculations to clear the semen of sperm, but this can differ in different men.  For this reason, it is very important that your semen be checked for sperm before you are considered sterile, and this should be done approximately 12 weeks after your vasectomy.      6. Vasectomy has risks and benefits.  Among the risks are possible complications resulting from the procedure.  These risks include but are not limited to:   A.  Bleeding, infection, or hematoma occuring during or in the recovery  period   from the procedure.   B.  Sperm granuloma or a small pea to walnut sized lump which is a collection of   scar tissue and sperm in your scrotal sack and remains permanently   C.  There may be an increased risk of prostate cancer although the data is   unclear.

## 2021-07-06 ENCOUNTER — OFFICE VISIT (OUTPATIENT)
Dept: UROLOGY | Facility: CLINIC | Age: 41
End: 2021-07-06
Payer: COMMERCIAL

## 2021-07-06 DIAGNOSIS — Z30.2 ENCOUNTER FOR STERILIZATION: Primary | ICD-10-CM

## 2021-07-06 PROCEDURE — 55250 REMOVAL OF SPERM DUCT(S): CPT | Performed by: UROLOGY

## 2021-07-06 PROCEDURE — 88302 TISSUE EXAM BY PATHOLOGIST: CPT

## 2021-07-06 PROCEDURE — 99000 SPECIMEN HANDLING OFFICE-LAB: CPT | Performed by: UROLOGY

## 2021-07-08 LAB — COPATH REPORT: NORMAL

## 2021-07-13 NOTE — PATIENT INSTRUCTIONS
"  Patient Education   Tips for Sleep Hygiene  \"Sleep hygiene\" means having good sleep habits.Follow these tips to sleep better at night:     Get on a schedule. Go to bed and get up at about the same time every day.    Listen to your body. Only try to sleep when you actually feel tired or sleepy.    Be patient. If you haven't been able to get to sleep after about 30 minutes or more, get up and do something calming or boring until you feel sleepy. Then return to bed and try again.    Don't have caffeine (coffee, tea, cola drinks, chocolate and some medicines), alcohol or nicotine (cigarettes). These can make it harder for you to fall asleep and stay asleep.    Use your bed for sleeping only. That means no TV, computer or homework in bed, especially during the evening and before bedtime.    Don't nap during the day. If you must nap, make sure it is for less than 20 minutes.    Create sleep rituals that remind your body it is time to sleep. Examples include breathing exercises, stretching or reading a book.    Avoid all electronic media (smart phone, computer, tablet) within 2 hours of bed time. The \"blue light\" in these devices activates the part of the brain that keeps you awake.    Dim the lights at night.    Get early morning sources of light (walk in the sunshine) to help set sleep patterns at night.    Try a bath or shower before bed. Having a warm bath 1 to 2 hours before bedtime can help you feel sleepy. Hot baths can make you alert, so be mindful of the temperature.    Don't watch the clock. Checking the clock during the night can wake you up. It can also lead to negative thoughts such as, \"I will never fall asleep,\" which can increase anxiety and sleeplessness.    Use a sleep diary. Track your sleep schedule to know your sleep patterns and to see where you can improve.    Get regular exercise every day. Try not to do heavy exercise in the 4 hours before bedtime.    Eat a healthy, balanced diet.    Try eating a " light, healthy snack before bed, but avoid eating a heavy meal.    Create the right sleeping area. A cool, dark, quiet room is best. If needed, try earplugs, fans and blackout curtains.    Keep your daytime routine the same even if you have a bad night sleep. Avoiding activities the next day can make it harder to sleep.  For informational purposes only. Not to replace the advice of your health care provider.   Copyright   2013 [facility]. All rights reserved. [employer] 617656 - 01/16.     [facility]     If you have any questions regarding your visit please contact your care team:     Team Gold                Clinic Hours Telephone Number     Dr. Rory Aviles, [facility]   7am-7pm  Monday - Thursday   7am-5pm  Fridays  [phone]   (Appointment scheduling available 24/7)     RN Line  [phone] option 2     [facility] [place] [facility]pm Monday-Friday Saturday-Sunday- 9am-5pm     [place] -   5pm-9pm Monday-Friday Saturday-Sunday- 9am-5pm    [phone] - [place]    [phone] - [place]     For a Price Quote for your services, please call our Consumer Price Line at [phone].     What options do I have for visits at the clinic other than the traditional office visit?     To expand how we care for you, many of our providers are utilizing electronic visits (e-visits) and telephone visits, when medically appropriate, for interactions with their patients rather than a visit in the clinic. We also offer nurse visits for many medical concerns. Just like any other service, we will bill your insurance company for this type of visit based on time spent on the phone with your provider. Not all insurance companies cover these visits. Please check with your medical insurance if this type of visit is covered. You will be responsible for any charges that are not paid by your insurance.     E-visits via [employer]:  generally incur a $45.00 fee.     Telephone visits:  Time spent on the phone: *charged based on time that is spent on the phone in increments of 10 minutes. Estimated cost:   5-10 mins $30.00   11-20 mins. $59.00   21-30 mins. $85.00       Use [employer] (secure email communication and access to your chart) to send your primary care provider a message or make an appointment. Ask someone on your Team how to sign up for Mem[employer].     As always, Thank you for trusting us with your health care needs!      [facility]:    Scheduling Appointments  [facility]  Call: [phone]    [facility]  Call: [phone]    [facility]  Call: [phone]    For Gastroenterology referrals   [facility]   Clinics and Surgery Center, 4th Floor   [address] [place] [address]   Appointments: [phone]    WHERE TO GO FOR CARE?    Clinic    Make an appointment if you:       Are sick (cold, cough, flu, sore throat, earache or in pain).       Have a small injury (sprain, small cut, burn or broken bone).       Need a physical exam, Pap smear, vaccine or prescription refill.       Have questions about your health or medicines.    To reach us:      Call [phone] ([phone]). Open 24 hours every day. (For counseling services, call [phone].)    Log into Molecular Detection at [email or web address]. (Visit [email or web address] to create an account.) Hospital emergency room    An emergency is a serious or life- threatening problem that must be treated right away.    Call 752 or get to the hospital if you have:      Very bad or sudden:            - Chest pain or pressure         - Bleeding         - Head or belly pain         - Dizziness or trouble seeing, walking or                          Speaking      Problems breathing      Blood in your vomit or you are coughing up blood      A major injury (knocked out, loss of a  finger or limb, rape, broken bone protruding from skin)    A mental health crisis. (Or call the Mental Health Crisis line at 1-270.567.7304 or Suicide Prevention Hotline at 1-845.476.9164.)    Open 24 hours every day. You don't need an appointment.     Urgent care    Visit urgent care for sickness or small injuries when the clinic is closed. You don't need an appointment. To check hours or find an urgent care near you, visit [email or web address]. Online care    Get online care from [facility] for more than 70 common problems, like colds, allergies and infections. Open 24 hours every day at:   [email or web address]   Need help deciding?    For advice about where to be seen, you may call your clinic and ask to speak with a nurse. We're here for you 24 hours every day.         If you are deaf or hard of hearing, please let us know. We provide many free services including sign language interpreters, oral interpreters, TTYs, telephone amplifiers, note takers and written materials.              Lot # (Optional): [id number] Lot # For Kenalog (Optional): [id number]

## 2021-09-26 ENCOUNTER — HEALTH MAINTENANCE LETTER (OUTPATIENT)
Age: 41
End: 2021-09-26

## 2021-09-29 ENCOUNTER — ALLIED HEALTH/NURSE VISIT (OUTPATIENT)
Dept: NURSING | Facility: CLINIC | Age: 41
End: 2021-09-29
Payer: COMMERCIAL

## 2021-09-29 DIAGNOSIS — Z23 NEED FOR PROPHYLACTIC VACCINATION AND INOCULATION AGAINST INFLUENZA: Primary | ICD-10-CM

## 2021-09-29 PROCEDURE — 90471 IMMUNIZATION ADMIN: CPT

## 2021-09-29 PROCEDURE — 90686 IIV4 VACC NO PRSV 0.5 ML IM: CPT

## 2021-09-29 PROCEDURE — 99207 PR NO CHARGE NURSE ONLY: CPT

## 2021-10-08 ENCOUNTER — LAB (OUTPATIENT)
Dept: LAB | Facility: CLINIC | Age: 41
End: 2021-10-08
Payer: COMMERCIAL

## 2021-10-08 DIAGNOSIS — Z30.2 ENCOUNTER FOR STERILIZATION: ICD-10-CM

## 2021-10-08 LAB
SEMEN ANALYSIS P VAS PNL: NORMAL
SPERM MOTILE SMN QL MICRO: NORMAL

## 2021-10-08 PROCEDURE — 89321 SEMEN ANAL SPERM DETECTION: CPT

## 2022-02-10 ENCOUNTER — LAB (OUTPATIENT)
Dept: LAB | Facility: CLINIC | Age: 42
End: 2022-02-10
Payer: COMMERCIAL

## 2022-02-10 DIAGNOSIS — Z30.2 ENCOUNTER FOR STERILIZATION: ICD-10-CM

## 2022-02-10 LAB — SEMEN ANALYSIS P VAS PNL: NORMAL

## 2022-02-10 PROCEDURE — 89321 SEMEN ANAL SPERM DETECTION: CPT

## 2022-02-10 NOTE — LETTER
February 10, 2022      Francisco Rebolledo  [address]  [place] [address]        Dear ,    We are writing to inform you of your test results.    This is your first negative semen analysis.  You will need to submit a second semen analysis.  This can be done at any time.  You need to negative to confirm sterility.    Resulted Orders   Semen Analysis Post Vasectomy   Result Value Ref Range    Sperm Count Semen No Sperm Seen        If you have any questions or concerns, please call the clinic at the number listed above.       Sincerely,      Wil Arroyo MD

## 2022-02-28 ENCOUNTER — LAB (OUTPATIENT)
Dept: LAB | Facility: CLINIC | Age: 42
End: 2022-02-28

## 2022-02-28 ENCOUNTER — OFFICE VISIT (OUTPATIENT)
Dept: FAMILY MEDICINE | Facility: CLINIC | Age: 42
End: 2022-02-28
Payer: COMMERCIAL

## 2022-02-28 VITALS
RESPIRATION RATE: 16 BRPM | HEART RATE: 76 BPM | SYSTOLIC BLOOD PRESSURE: 138 MMHG | BODY MASS INDEX: 28.56 KG/M2 | WEIGHT: 182 LBS | DIASTOLIC BLOOD PRESSURE: 86 MMHG | OXYGEN SATURATION: 100 % | TEMPERATURE: 98.4 F | HEIGHT: 67 IN

## 2022-02-28 DIAGNOSIS — I10 ESSENTIAL HYPERTENSION: ICD-10-CM

## 2022-02-28 DIAGNOSIS — H11.003 PTERYGIUM EYE, BILATERAL: Primary | ICD-10-CM

## 2022-02-28 DIAGNOSIS — Z30.2 ENCOUNTER FOR STERILIZATION: ICD-10-CM

## 2022-02-28 LAB — SEMEN ANALYSIS P VAS PNL: NORMAL

## 2022-02-28 PROCEDURE — 99214 OFFICE O/P EST MOD 30 MIN: CPT | Performed by: FAMILY MEDICINE

## 2022-02-28 PROCEDURE — 89321 SEMEN ANAL SPERM DETECTION: CPT

## 2022-02-28 ASSESSMENT — PAIN SCALES - GENERAL: PAINLEVEL: NO PAIN (0)

## 2022-02-28 NOTE — PATIENT INSTRUCTIONS
lubicating drops 2-3 times a day     Lubricating ointment before sleep     Wear sunglasses if outside and driving    Do not rub-- makes it grow    See the eye doctor     Check BP <135/85 goal    Follow up in 3 months       Lauren Varner D.O.

## 2022-02-28 NOTE — PROGRESS NOTES
"  Assessment & Plan     Pterygium eye, bilateral     lubicating drops 2-3 times a day     Lubricating ointment before sleep     Wear sunglasses if outside and driving    Do not rub-- makes it grow    See the eye doctor     - Adult Eye Referral; Future    Essential hypertension  The current medical regimen is effective;  continue present plan and medications.        30 minutes spent on the date of the encounter doing chart review, history and exam, documentation and further activities per the note       BMI:   Estimated body mass index is 28.51 kg/m  as calculated from the following:    Height as of this encounter: 1.702 m (5' 7\").    Weight as of this encounter: 82.6 kg (182 lb).       Return in about 3 months (around 5/28/2022) for BP Recheck.    Lauren Varner DO  [facility]    Hung Singh is a 41 year old who presents for the following health issues     History of Present Illness     Reason for visit:  Vision  Symptom onset:  More than a month  Symptoms include:  Tearing and difficulty seeing things  Symptom intensity:  Mild  Symptom progression:  Worsening  Had these symptoms before:  Yes  Has tried/received treatment for these symptoms:  No  What makes it worse:  Looking at light    He eats 2-3 servings of fruits and vegetables daily.He consumes 0 sweetened beverage(s) daily.He exercises with enough effort to increase his heart rate 30 to 60 minutes per day.  He exercises with enough effort to increase his heart rate 3 or less days per week. He is missing 1 dose(s) of medications per week.       Hypertension Follow-up      Do you check your blood pressure regularly outside of the clinic? Yes     Are you following a low salt diet? Yes    Are your blood pressures ever more than 140 on the top number (systolic) OR more   than 90 on the bottom number (diastolic), for example 140/90? Yes 150/94, 150/104  Norvasc 5 mg daily    Migraine     Since your last clinic visit, how have your " "headaches changed?  Improved    How often are you getting headaches or migraines? - has not had any for 2 years     Are you able to do normal daily activities when you have a migraine? Yes    Are you taking rescue/relief medications? (Select all that apply) No    How helpful is your rescue/relief medication?   No meds at this time    Are you taking any medications to prevent migraines? (Select all that apply)  No    In the past 4 weeks, how often have you gone to urgent care or the emergency room because of your headaches?  0    He has noticed a spot growing on his right eye.  It is getting worse.  He feels like there is sometthing in his eye.  He  Has a lot of tearing.      Review of Systems   Constitutional, HEENT, cardiovascular, pulmonary, gi and gu systems are negative, except as otherwise noted.      Objective    BP (!) 148/94 (BP Location: Right arm, Patient Position: Sitting, Cuff Size: Adult Regular)   Pulse 76   Temp 98.4  F (36.9  C) (Oral)   Resp 16   Ht 1.702 m (5' 7\")   Wt 82.6 kg (182 lb)   SpO2 100%   BMI 28.51 kg/m    Body mass index is 28.51 kg/m .  Physical Exam   GENERAL: healthy, alert and no distress  EYES: PERRL, EOMI and pterygium bilaterally   NECK: no adenopathy, no asymmetry, masses, or scars and thyroid normal to palpation  RESP: lungs clear to auscultation - no rales, rhonchi or wheezes  CV: regular rate and rhythm, normal S1 S2, no S3 or S4, no murmur, click or rub, no peripheral edema and peripheral pulses strong  MS: no gross musculoskeletal defects noted, no edema  PSYCH: mentation appears normal, affect normal/bright          "

## 2022-03-09 ENCOUNTER — LAB REQUISITION (OUTPATIENT)
Dept: LAB | Facility: CLINIC | Age: 42
End: 2022-03-09

## 2022-03-09 PROCEDURE — 86481 TB AG RESPONSE T-CELL SUSP: CPT | Performed by: INTERNAL MEDICINE

## 2022-03-11 LAB
GAMMA INTERFERON BACKGROUND BLD IA-ACNC: 0.06 IU/ML
M TB IFN-G BLD-IMP: NEGATIVE
M TB IFN-G CD4+ BCKGRND COR BLD-ACNC: 9.94 IU/ML
MITOGEN IGNF BCKGRD COR BLD-ACNC: -0.01 IU/ML
MITOGEN IGNF BCKGRD COR BLD-ACNC: 0.01 IU/ML
QUANTIFERON MITOGEN: 10 IU/ML
QUANTIFERON NIL TUBE: 0.06 IU/ML
QUANTIFERON TB1 TUBE: 0.05 IU/ML
QUANTIFERON TB2 TUBE: 0.07

## 2022-04-05 ENCOUNTER — OFFICE VISIT (OUTPATIENT)
Dept: OPHTHALMOLOGY | Facility: CLINIC | Age: 42
End: 2022-04-05
Attending: FAMILY MEDICINE
Payer: COMMERCIAL

## 2022-04-05 DIAGNOSIS — H11.003 PTERYGIUM EYE, BILATERAL: ICD-10-CM

## 2022-04-05 DIAGNOSIS — H11.003 PTERYGIUM EYE, BILATERAL: Primary | ICD-10-CM

## 2022-04-05 PROCEDURE — 92285 EXTERNAL OCULAR PHOTOGRAPHY: CPT | Performed by: OPHTHALMOLOGY

## 2022-04-05 PROCEDURE — 99204 OFFICE O/P NEW MOD 45 MIN: CPT | Mod: GC | Performed by: OPHTHALMOLOGY

## 2022-04-05 PROCEDURE — G0463 HOSPITAL OUTPT CLINIC VISIT: HCPCS

## 2022-04-05 ASSESSMENT — REFRACTION_WEARINGRX
OS_SPHERE: -0.75
OS_AXIS: 105
OD_SPHERE: -1.00
OD_ADD: +1.25
OD_AXIS: 047
OS_CYLINDER: +0.50
OD_CYLINDER: +0.75
OS_ADD: +1.25

## 2022-04-05 ASSESSMENT — CONF VISUAL FIELD
METHOD: COUNTING FINGERS
OS_NORMAL: 1
OD_NORMAL: 1

## 2022-04-05 ASSESSMENT — VISUAL ACUITY
METHOD: SNELLEN - LINEAR
OS_PH_CC: 20/25
OD_CC: 20/25
OS_CC: 20/30
CORRECTION_TYPE: GLASSES

## 2022-04-05 ASSESSMENT — TONOMETRY
OS_IOP_MMHG: 09
OD_IOP_MMHG: 12
IOP_METHOD: ICARE

## 2022-04-05 ASSESSMENT — SLIT LAMP EXAM - LIDS
COMMENTS: NORMAL
COMMENTS: NORMAL

## 2022-04-05 ASSESSMENT — EXTERNAL EXAM - RIGHT EYE: OD_EXAM: NORMAL

## 2022-04-05 ASSESSMENT — EXTERNAL EXAM - LEFT EYE: OS_EXAM: NORMAL

## 2022-04-05 NOTE — NURSING NOTE
"Chief Complaints and History of Present Illnesses   Patient presents with     Pterygium Evaluation     Chief Complaint(s) and History of Present Illness(es)     Pterygium Evaluation               Comments     Pt states vision is 'not too bad\". RE appearing more blurry over the past 6 months. RE also tearing more.  No eye pain today. No redness. Dryness in BE, relief with drops.    JOCELIN Dunne April 5, 2022 7:51 AM                    "

## 2022-04-05 NOTE — PROGRESS NOTES
CC: Pterygium    Referring provider: Self-referred    HPI:  Ephraim Rebolledo is a(n) 41 year old male who presents for evaluation of pterygium right eye. He notes that it has been present for many years, unsure if it has grown during this time. He believes that his vision in the right eye is more blurred than the vision in his left eye. He notes that he has had increased tearing and irritation in the right eye over the last several months. He wonders if these symptoms may be secondary to the pterygium. He also notes that he has a pterygium in the left eye, states that it is not as bothersome as the one in the right He denies new eye pain, flashes, or change in floaters.    POHx:  Refractive error    Glasses: None  CTL wearer: None    Family hx of eye disease: no AMD, glaucoma  Social Hx: nurse    Meds:  AT PRN    A/P:  #Pterygia, each eye   -Visually significant and symptomatic  -R/b/a of pterygium excision discussed, patient would like to proceed. Case request submitted.  -Recommended UV protection and sunglasses wear  -Disc each eye pterygia. Disc right eye pterygium excision first, then left eye. Disc R, B, PC and Options. Conj autograft, AM and tisseel (off-label).      #Refractive error   -Not interested in glasses wear at this time    Follow up: post op  --    Janelle Joshi MD  Ophthalmology Resident, PGY-3  [facility]       Attending Physician Attestation:  Complete documentation of historical and exam elements from today's encounter can be found in the full encounter summary report (not reduplicated in this progress note).  I personally obtained the chief complaint(s) and history of present illness.  I confirmed and edited as necessary the review of systems, past medical/surgical history, family history, social history, and examination findings as documented by others; and I examined the patient myself.  I personally reviewed the relevant tests, images, and reports as documented above.  I  formulated and edited as necessary the assessment and plan and discussed the findings and management plan with the patient and family. - Freddie Cleary MD

## 2022-04-11 ENCOUNTER — TELEPHONE (OUTPATIENT)
Dept: OPHTHALMOLOGY | Facility: CLINIC | Age: 42
End: 2022-04-11
Payer: COMMERCIAL

## 2022-04-11 PROBLEM — H11.003 PTERYGIUM EYE, BILATERAL: Status: ACTIVE | Noted: 2022-04-11

## 2022-04-11 NOTE — TELEPHONE ENCOUNTER
I called patient to schedule surgery with Dr. Freddie Cleary, I left a voicemail with callback # [phone]

## 2022-04-12 ENCOUNTER — TELEPHONE (OUTPATIENT)
Dept: OPHTHALMOLOGY | Facility: CLINIC | Age: 42
End: 2022-04-12
Payer: COMMERCIAL

## 2022-04-12 NOTE — TELEPHONE ENCOUNTER
Patient is scheduled for surgery with Dr. Freddie Cleary     Spoke with: Francisco     Date of Surgery: 05/11     Location: [facility] Clinics and Surgery Center:  [address] [place] [address]     Informed patient they will need an adult : Yes     H&P will be completed at: PCP, [facility]     COVID testing:  [facility]    Post Op scheduled on 05/12, 05/19, 06/14, and 07/14     Surgery packet was mailed 04/12     Additional comments: Advised RN will call 1 - 2 business days prior with arrival time and instructions.

## 2022-04-13 DIAGNOSIS — Z11.59 ENCOUNTER FOR SCREENING FOR OTHER VIRAL DISEASES: Primary | ICD-10-CM

## 2022-05-03 DIAGNOSIS — H11.003 PTERYGIUM EYE, BILATERAL: Primary | ICD-10-CM

## 2022-05-03 RX ORDER — MOXIFLOXACIN 5 MG/ML
1 SOLUTION/ DROPS OPHTHALMIC 3 TIMES DAILY
Qty: 3 ML | Refills: 1 | Status: SHIPPED | OUTPATIENT
Start: 2022-05-03 | End: 2024-02-27

## 2022-05-03 RX ORDER — PREDNISOLONE ACETATE 10 MG/ML
1 SUSPENSION/ DROPS OPHTHALMIC 3 TIMES DAILY
Qty: 10 ML | Refills: 1 | Status: SHIPPED | OUTPATIENT
Start: 2022-05-03 | End: 2024-02-27

## 2022-05-03 RX ORDER — KETOROLAC TROMETHAMINE 5 MG/ML
1 SOLUTION OPHTHALMIC 3 TIMES DAILY
Qty: 5 ML | Refills: 0 | Status: SHIPPED | OUTPATIENT
Start: 2022-05-03 | End: 2024-02-27

## 2022-05-07 ENCOUNTER — HEALTH MAINTENANCE LETTER (OUTPATIENT)
Age: 42
End: 2022-05-07

## 2022-05-09 ENCOUNTER — LAB (OUTPATIENT)
Dept: LAB | Facility: CLINIC | Age: 42
End: 2022-05-09
Payer: COMMERCIAL

## 2022-05-09 DIAGNOSIS — Z11.59 ENCOUNTER FOR SCREENING FOR OTHER VIRAL DISEASES: ICD-10-CM

## 2022-05-09 PROCEDURE — U0003 INFECTIOUS AGENT DETECTION BY NUCLEIC ACID (DNA OR RNA); SEVERE ACUTE RESPIRATORY SYNDROME CORONAVIRUS 2 (SARS-COV-2) (CORONAVIRUS DISEASE [COVID-19]), AMPLIFIED PROBE TECHNIQUE, MAKING USE OF HIGH THROUGHPUT TECHNOLOGIES AS DESCRIBED BY CMS-2020-01-R: HCPCS

## 2022-05-09 PROCEDURE — U0005 INFEC AGEN DETEC AMPLI PROBE: HCPCS

## 2022-05-10 ENCOUNTER — TELEPHONE (OUTPATIENT)
Dept: OPHTHALMOLOGY | Facility: CLINIC | Age: 42
End: 2022-05-10
Payer: COMMERCIAL

## 2022-05-10 ENCOUNTER — ANESTHESIA EVENT (OUTPATIENT)
Dept: SURGERY | Facility: AMBULATORY SURGERY CENTER | Age: 42
End: 2022-05-10
Payer: COMMERCIAL

## 2022-05-10 LAB — SARS-COV-2 RNA RESP QL NAA+PROBE: NEGATIVE

## 2022-05-10 NOTE — TELEPHONE ENCOUNTER
Spoke with Francisco to find out if he was getting a pre-op physical for surgery tomorrow. Francisco said he had his COVID test yesterday but that he didn't know anything about needing a Pre-op physical. I reminded Francisco that when we scheduled in April he said he would set up his Pre-op physical with his PCP at [facility].     I told Francisco I spoke with Dr. Freddie Cleary and Dr. Wil Mejia and they agreed to complete his Pre-op in between surgeries tomorrow.

## 2022-05-11 ENCOUNTER — HOSPITAL ENCOUNTER (OUTPATIENT)
Facility: AMBULATORY SURGERY CENTER | Age: 42
Discharge: HOME | End: 2022-05-11
Attending: OPHTHALMOLOGY
Payer: COMMERCIAL

## 2022-05-11 ENCOUNTER — ANESTHESIA (OUTPATIENT)
Dept: SURGERY | Facility: AMBULATORY SURGERY CENTER | Age: 42
End: 2022-05-11
Payer: COMMERCIAL

## 2022-05-11 VITALS
WEIGHT: 178 LBS | HEIGHT: 67 IN | SYSTOLIC BLOOD PRESSURE: 119 MMHG | BODY MASS INDEX: 27.94 KG/M2 | HEART RATE: 66 BPM | TEMPERATURE: 98.2 F | RESPIRATION RATE: 12 BRPM | OXYGEN SATURATION: 99 % | DIASTOLIC BLOOD PRESSURE: 74 MMHG

## 2022-05-11 DIAGNOSIS — H11.003 PTERYGIUM EYE, BILATERAL: Primary | ICD-10-CM

## 2022-05-11 PROCEDURE — 65426 REMOVAL OF EYE LESION: CPT | Mod: RT | Performed by: OPHTHALMOLOGY

## 2022-05-11 PROCEDURE — 65426 REMOVAL OF EYE LESION: CPT | Mod: RT

## 2022-05-11 PROCEDURE — 66999 UNLISTED PX ANT SEGMENT EYE: CPT | Mod: RT | Performed by: OPHTHALMOLOGY

## 2022-05-11 DEVICE — EYE IMP AMNIOTIC MEMBRANE 2X3CM AMBIODRY2 AD-5230: Type: IMPLANTABLE DEVICE | Site: EYE | Status: FUNCTIONAL

## 2022-05-11 RX ORDER — SODIUM CHLORIDE, SODIUM LACTATE, POTASSIUM CHLORIDE, CALCIUM CHLORIDE 600; 310; 30; 20 MG/100ML; MG/100ML; MG/100ML; MG/100ML
INJECTION, SOLUTION INTRAVENOUS CONTINUOUS
Status: DISCONTINUED | OUTPATIENT
Start: 2022-05-11 | End: 2022-05-12 | Stop reason: HOSPADM

## 2022-05-11 RX ORDER — LIDOCAINE HYDROCHLORIDE AND EPINEPHRINE 10; 10 MG/ML; UG/ML
INJECTION, SOLUTION INFILTRATION; PERINEURAL PRN
Status: DISCONTINUED | OUTPATIENT
Start: 2022-05-11 | End: 2022-05-11 | Stop reason: HOSPADM

## 2022-05-11 RX ORDER — FENTANYL CITRATE 50 UG/ML
INJECTION, SOLUTION INTRAMUSCULAR; INTRAVENOUS PRN
Status: DISCONTINUED | OUTPATIENT
Start: 2022-05-11 | End: 2022-05-11

## 2022-05-11 RX ORDER — PROPARACAINE HYDROCHLORIDE 5 MG/ML
1 SOLUTION/ DROPS OPHTHALMIC ONCE
Status: COMPLETED | OUTPATIENT
Start: 2022-05-11 | End: 2022-05-11

## 2022-05-11 RX ORDER — ONDANSETRON 2 MG/ML
INJECTION INTRAMUSCULAR; INTRAVENOUS PRN
Status: DISCONTINUED | OUTPATIENT
Start: 2022-05-11 | End: 2022-05-11

## 2022-05-11 RX ORDER — BALANCED SALT SOLUTION 6.4; .75; .48; .3; 3.9; 1.7 MG/ML; MG/ML; MG/ML; MG/ML; MG/ML; MG/ML
SOLUTION OPHTHALMIC PRN
Status: DISCONTINUED | OUTPATIENT
Start: 2022-05-11 | End: 2022-05-11 | Stop reason: HOSPADM

## 2022-05-11 RX ORDER — TETRACAINE HYDROCHLORIDE 5 MG/ML
SOLUTION OPHTHALMIC PRN
Status: DISCONTINUED | OUTPATIENT
Start: 2022-05-11 | End: 2022-05-11 | Stop reason: HOSPADM

## 2022-05-11 RX ORDER — LIDOCAINE 40 MG/G
CREAM TOPICAL
Status: DISCONTINUED | OUTPATIENT
Start: 2022-05-11 | End: 2022-05-12 | Stop reason: HOSPADM

## 2022-05-11 RX ORDER — MOXIFLOXACIN 5 MG/ML
1 SOLUTION/ DROPS OPHTHALMIC
Status: COMPLETED | OUTPATIENT
Start: 2022-05-11 | End: 2022-05-11

## 2022-05-11 RX ORDER — PREDNISOLONE ACETATE 1 %
SUSPENSION, DROPS(FINAL DOSAGE FORM)(ML) OPHTHALMIC (EYE) PRN
Status: DISCONTINUED | OUTPATIENT
Start: 2022-05-11 | End: 2022-05-11 | Stop reason: HOSPADM

## 2022-05-11 RX ADMIN — PROPARACAINE HYDROCHLORIDE 1 DROP: 5 SOLUTION/ DROPS OPHTHALMIC at 12:35

## 2022-05-11 RX ADMIN — FENTANYL CITRATE 25 MCG: 50 INJECTION, SOLUTION INTRAMUSCULAR; INTRAVENOUS at 13:47

## 2022-05-11 RX ADMIN — MOXIFLOXACIN 1 DROP: 5 SOLUTION/ DROPS OPHTHALMIC at 12:44

## 2022-05-11 RX ADMIN — ONDANSETRON 4 MG: 2 INJECTION INTRAMUSCULAR; INTRAVENOUS at 13:24

## 2022-05-11 RX ADMIN — FENTANYL CITRATE 25 MCG: 50 INJECTION, SOLUTION INTRAMUSCULAR; INTRAVENOUS at 13:31

## 2022-05-11 RX ADMIN — SODIUM CHLORIDE, SODIUM LACTATE, POTASSIUM CHLORIDE, CALCIUM CHLORIDE: 600; 310; 30; 20 INJECTION, SOLUTION INTRAVENOUS at 12:44

## 2022-05-11 RX ADMIN — FENTANYL CITRATE 50 MCG: 50 INJECTION, SOLUTION INTRAMUSCULAR; INTRAVENOUS at 13:24

## 2022-05-11 RX ADMIN — MOXIFLOXACIN 1 DROP: 5 SOLUTION/ DROPS OPHTHALMIC at 12:35

## 2022-05-11 RX ADMIN — MOXIFLOXACIN 1 DROP: 5 SOLUTION/ DROPS OPHTHALMIC at 12:39

## 2022-05-11 NOTE — ANESTHESIA POSTPROCEDURE EVALUATION
Patient: Ephraim Rebolledo    Procedure: Procedure(s):  EXCISION, PTERYGIUM, WITH CONJUNCTIVAL AUTOGRAFT TRANSPLANT AND AMNIOTIC MEMBRANE - RIGHT EYE       Anesthesia Type:  MAC    Note:  Disposition: Outpatient   Postop Pain Control: Uneventful            Sign Out: Well controlled pain   PONV: No   Neuro/Psych: Uneventful            Sign Out: Acceptable/Baseline neuro status   Airway/Respiratory: Uneventful            Sign Out: Acceptable/Baseline resp. status   CV/Hemodynamics: Uneventful            Sign Out: Acceptable CV status; No obvious hypovolemia; No obvious fluid overload   Other NRE: NONE   DID A NON-ROUTINE EVENT OCCUR? No           Last vitals:  Vitals Value Taken Time   /85 05/11/22 1420   Temp 36.8  C (98.2  F) 05/11/22 1420   Pulse 69 05/11/22 1420   Resp 12 05/11/22 1420   SpO2 99 % 05/11/22 1420       Electronically Signed By: LUIS CARLOS VALERO MD  May 11, 2022  2:26 PM

## 2022-05-11 NOTE — DISCHARGE INSTRUCTIONS
[facility]  Home Care Following Anesthesia  For 24 hours after surgery:  Get plenty of rest.  A responsible adult must stay with you for at least 24 hours after you leave the surgery center.  Do not drive or use heavy equipment.  If you have weakness or tingling, don't drive or use heavy equipment until this feeling goes away.   Do not drink alcohol.   Avoid strenuous or risky activities.  Ask for help when climbing stairs.  You may feel lightheaded.  IF so, sit for a few minutes before standing.  Have someone help you get up.   If you have nausea (feel sick to your stomach): Drink only clear liquids such as apple juice, ginger ale, broth or 7-Up.  Rest may also help.  Be sure to drink enough fluids.  Move to a regular diet as you feel able.   You may have a slight fever.  Call the doctor if your fever is over 100 F (37.7 C) (taken under the tongue) or lasts longer than 24 hours.  You may have a dry mouth, a sore throat, muscle aches or trouble sleeping. These should go away after 24 hours.  Do not make important or legal decisions.   It is recommended to avoid smoking.               Tips for taking pain medications  To get the best pain relief possible, remember these points:  Take pain medications as directed, before pain becomes severe.  Pain medication can upset your stomach: taking it with food may help.  Constipation is a common side effect of pain medication. Drink plenty of  fluids.  Eat foods high in fiber. Take a stool softener if recommended by your doctor or pharmacist.  Do not drink alcohol, drive or operate machinery while taking pain medications.  Ask about other ways to control pain, such as with heat, ice or relaxation.    Tylenol/Acetaminophen Consumption  To help encourage the safe use of acetaminophen, the makers of TYLENOL  have lowered the maximum daily dose for single-ingredient Extra Strength TYLENOL  (acetaminophen) products sold in the [place] from 8 pills  per day (4,000 mg) to 6 pills per day (3,000 mg). The dosing interval has also changed from 2 pills every 4-6 hours to 2 pills every 6 hours.  If you feel your pain relief is insufficient, you may take Tylenol/Acetaminophen in addition to your narcotic pain medication.   Be careful not to exceed 3,000 mg of Tylenol/Acetaminophen in a 24 hour period from all sources.  If you are taking extra strength Tylenol/acetaminophen (500 mg), the maximum dose is 6 tablets in 24 hours.  If you are taking regular strength acetaminophen (325 mg), the maximum dose is 9 tablets in 24 hours.    Call a doctor for any of the following:  Signs of infection (fever, growing tenderness at the surgery site, a large amount of drainage or bleeding, severe pain, foul-smelling drainage, redness, swelling).  It has been over 8 to 10 hours since surgery and you are still not able to urinate (pass water).  Headache for over 24 hours.  Numbness, tingling or weakness the day after surgery (if you had spinal anesthesia).  Signs of Covid-19 infection (temperature over 100 degrees, shortness of breath, cough, loss of taste/smell, generalized body aches, persistent headache, chills, sore throat, nausea/vomiting/diarrhea)  Your doctor is:  Dr. Freddie Cleary, Ophthalmology: [phone]                    Or dial [phone] and ask for the resident on call for:  Ophthalmology  For emergency care, call the:  [facility]:  [phone] (TTY for hearing impaired: [phone])

## 2022-05-11 NOTE — ANESTHESIA CARE TRANSFER NOTE
Patient: Ephraim Rebolledo    Procedure: Procedure(s):  EXCISION, PTERYGIUM, WITH CONJUNCTIVAL AUTOGRAFT TRANSPLANT AND AMNIOTIC MEMBRANE - RIGHT EYE       Diagnosis: Pterygium eye, bilateral [H11.003]  Diagnosis Additional Information: No value filed.    Anesthesia Type:   MAC     Note:    Oropharynx: oropharynx clear of all foreign objects and spontaneously breathing  Level of Consciousness: awake  Oxygen Supplementation: room air    Independent Airway: airway patency satisfactory and stable  Dentition: dentition unchanged      Patient transferred to: Phase II    Handoff Report: Identifed the Patient, Identified the Reponsible Provider, Reviewed the pertinent medical history, Discussed the surgical course, Reviewed Intra-OP anesthesia mangement and issues during anesthesia, Set expectations for post-procedure period and Allowed opportunity for questions and acknowledgement of understanding      Vitals:  Vitals Value Taken Time   /85    Temp 98.2    Pulse 66    Resp 16    SpO2 98        Electronically Signed By: KATHERYN JAIN CRNA  May 11, 2022  2:20 PM

## 2022-05-11 NOTE — OP NOTE
OPERATIVE REPORT    Patient Name: Ephraim Rebolledo  Date of Operation: May 11, 2022    Pre-operative diagnosis: Pterygium of the right eye  Post-operative diagnosis: Same     Procedure(s): To the right eye  Pterygium Excision  Conjunctival Autograft (4 mm x 4 mm)  Amniotic Membrane Transplantation (10 x 6 mm)      Attending: Dr. Freddie Cleary MD, PhD  Fellow: Wil Mejia DO    Anesthesia: MAC/topical   Findings: As expected   Blood Loss: None  Complications: None   Specimen: None    DESCRIPTION OF PROCEDURE   In the preoperative suite, the patient was identified, the surgical site marked and informed consent was obtained. The patient was the brought back to the operative suite where the appropriate anesthesia monitors were connected. A routine time-out was performed and monitors were placed by the anesthesia team. The patient's operative eye was then prepped and draped in the usual sterile fashion for ophthalmic surgery.    Following draping, a lid speculum was placed to the operative eye. A surgical marker was used to delineate the pterygium approximately 3.0 mm from the corneal limbus. Subconjunctival injection of lidocaine 1% with 1:100,000 epinephrine was administered in the area of the pterygium in order to provide hemostasis and to balloon up the conjunctiva in the area.  Using a crescent blade, the head of the pterygium was scraped from the corneal surface. There was noted to be extension of the pterygium into Gonzalez's layer at one small section. Using 0.12 forceps and sharp Izabella scissors, the base of the pterygium was excised down to bare sclera.  Hemostasis was achieved with cautery.  The edges of the conjunctiva were underminded and approx 2-3 mm of underlying Tenon's was excised around the full border of the defect. The remaining bare sclera was then measured using calipers and found to be 4 mm x 4 mm on the scleral bed and extending 5 mm on to the cornea.    The superior conjunctiva was then  exposed and area 4 x 4mm was marked using calipers and a marking to outline the conjunctival autograft. Care was taken to avoid involving the superior limbus. Next, lidocaine 1% with epinephrine 1:100,000 was again injected subconjunctivally in order to balloon up the superior conjunctiva. Using 0.12 forceps and sharp Izabella scissors, the conjunctiva was dissected off the underlying tenon's. The harvested graft was then moved over to the conjunctival defect where the pterygium was excised, taking care to maintain orientation of the graft. The graft was then glued (stromal side down) using Tisseel fibrin glue.    Dry amniotic membrane was then brought to the surgical field and cut to a 10x6 mm size. The amniotic membrane was then placed basement membrane side down over the cornea, conjunctival autograft, and residual bare sclera bed, and glued into place using Tisseel fibrin glue. A large diameter Kontour lens (18.0  mm) was then placed over the ocular surface to help maintain the position of the amniotic membrane and conjunctival autograft and also to help with pain from the superior conjunctival defect.    The lid speculum and drapes were carefully removed. Several drops of Ofloxacin, Prednisolone Acetate 1%, and Ketorolac were placed into the operative eye. An eye shield was taped over the eye. The patient was then taken to the recovery room in stable condition having tolerated the procedure well and discharged home in good condition.     Dr. Freddie Cleary was scrubbed and present for the entire surgery.    Wil Mejia DO  Cornea & External Disease Fellow    I was present for the entire procedure(s). - Freddie Cleary MD

## 2022-05-11 NOTE — ANESTHESIA PREPROCEDURE EVALUATION
Anesthesia Pre-Procedure Evaluation    Patient: Ephraim Rebolledo   MRN: [id number] : 1980        Procedure : Procedure(s):  EXCISION, PTERYGIUM, WITH CONJUNCTIVAL AUTOGRAFT TRANSPLANT AND AMNIOTIC MEMBRANE - RIGHT EYE          Past Medical History:   Diagnosis Date     Measles as Child      Past Surgical History:   Procedure Laterality Date     COLONOSCOPY N/A 10/1/2020    Procedure: COLONOSCOPY, WITH POLYPECTOMY AND BIOPSY;  Surgeon: Carmel Smith MD;  Location: [facility] OR     NO HISTORY OF SURGERY        No Known Allergies   Social History     Tobacco Use     Smoking status: Never Smoker     Smokeless tobacco: Never Used   Substance Use Topics     Alcohol use: Yes     Comment: occasionally, couple times a year      Wt Readings from Last 1 Encounters:   22 80.7 kg (178 lb)        Anesthesia Evaluation   Pt has had prior anesthetic. Type: MAC.        ROS/MED HX  ENT/Pulmonary:       Neurologic:       Cardiovascular:     (+) hypertension-----    METS/Exercise Tolerance: >4 METS    Hematologic:       Musculoskeletal:       GI/Hepatic:       Renal/Genitourinary:       Endo:       Psychiatric/Substance Use:       Infectious Disease:       Malignancy:       Other:            Physical Exam    Airway        Mallampati: I   TM distance: > 3 FB   Neck ROM: full   Mouth opening: > 3 cm    Respiratory Devices and Support         Dental  no notable dental history         Cardiovascular   cardiovascular exam normal          Pulmonary   pulmonary exam normal                OUTSIDE LABS:  CBC:   Lab Results   Component Value Date    WBC 5.6 2019    WBC 4.3 2019    HGB 13.4 2019    HGB 13.9 2019    HCT 40.2 2019    HCT 42.8 2019     2019     2019     BMP:   Lab Results   Component Value Date     2021     2020    POTASSIUM 3.3 (L) 2021    POTASSIUM 3.7 2020    CHLORIDE 104 2021    CHLORIDE 105  03/05/2020    CO2 30 05/17/2021    CO2 29 03/05/2020    BUN 11 05/17/2021    BUN 12 03/05/2020    CR 0.92 05/17/2021    CR 0.92 03/05/2020    GLC 89 05/17/2021    GLC 77 03/05/2020     COAGS:   Lab Results   Component Value Date    INR 1.04 04/24/2019     POC: No results found for: BGM, HCG, HCGS  HEPATIC:   Lab Results   Component Value Date    ALBUMIN 3.7 04/24/2019    PROTTOTAL 7.5 04/24/2019    ALT 39 04/24/2019    AST 31 04/24/2019    ALKPHOS 92 04/24/2019    BILITOTAL 0.3 04/24/2019     OTHER:   Lab Results   Component Value Date    A1C 5.5 06/14/2019    GEORGES 9.2 05/17/2021    TSH 0.61 08/30/2018    CRP 2.9 04/24/2019    SED 9 09/16/2014       Anesthesia Plan    ASA Status:  2   NPO Status:  NPO Appropriate    Anesthesia Type: MAC.     - Reason for MAC: straight local not clinically adequate, chronic cardiopulmonary disease   Induction: Intravenous.   Maintenance: TIVA.        Consents    Anesthesia Plan(s) and associated risks, benefits, and realistic alternatives discussed. Questions answered and patient/representative(s) expressed understanding.     - Discussed: Risks, Benefits and Alternatives for BOTH SEDATION and the PROCEDURE were discussed     - Discussed with:  Patient         Postoperative Care    Pain management: Multi-modal analgesia, IV analgesics.   PONV prophylaxis: Ondansetron (or other 5HT-3)     Comments:                LUIS CARLOS VALERO MD

## 2022-05-12 ENCOUNTER — OFFICE VISIT (OUTPATIENT)
Dept: OPHTHALMOLOGY | Facility: CLINIC | Age: 42
End: 2022-05-12
Attending: OPHTHALMOLOGY
Payer: COMMERCIAL

## 2022-05-12 DIAGNOSIS — H11.003 PTERYGIUM EYE, BILATERAL: ICD-10-CM

## 2022-05-12 DIAGNOSIS — Z98.890 POSTOPERATIVE EYE STATE: Primary | ICD-10-CM

## 2022-05-12 PROCEDURE — G0463 HOSPITAL OUTPT CLINIC VISIT: HCPCS

## 2022-05-12 PROCEDURE — 99024 POSTOP FOLLOW-UP VISIT: CPT | Mod: GC | Performed by: OPHTHALMOLOGY

## 2022-05-12 ASSESSMENT — VISUAL ACUITY
OS_SC+: +2
OD_PH_SC: 20/80
OS_SC: 20/40
OD_SC: 20/250
METHOD: SNELLEN - LINEAR

## 2022-05-12 ASSESSMENT — TONOMETRY
IOP_METHOD: ICARE
OS_IOP_MMHG: 14
OD_IOP_MMHG: 15

## 2022-05-12 ASSESSMENT — SLIT LAMP EXAM - LIDS
COMMENTS: EDEMA
COMMENTS: NORMAL

## 2022-05-12 ASSESSMENT — EXTERNAL EXAM - RIGHT EYE: OD_EXAM: NORMAL

## 2022-05-12 ASSESSMENT — EXTERNAL EXAM - LEFT EYE: OS_EXAM: NORMAL

## 2022-05-12 NOTE — H&P (VIEW-ONLY)
CC: Pterygium    Referring provider: Self-referred    HPI:  Ephraim Rebolledo is a(n) 41 year old male who presents for evaluation of pterygium right eye. He notes that it has been present for many years, unsure if it has grown during this time. He believes that his vision in the right eye is more blurred than the vision in his left eye. He notes that he has had increased tearing and irritation in the right eye over the last several months. He wonders if these symptoms may be secondary to the pterygium. He also notes that he has a pterygium in the left eye, states that it is not as bothersome as the one in the right He denies new eye pain, flashes, or change in floaters.      Interval hx: POD#1 s/p pterygium excision with autograft and AMT right eye (5/12/2022).  Pt notes 5/10 pain and extensive tearing of the right eye.      POHx:  Refractive error    Glasses: None  CTL wearer: None    Family hx of eye disease: no AMD, glaucoma  Social Hx: nurse    Meds:  AT PRN    A/P:  #Pterygia, each eye   -s/p pterygium excision with autograft and AMT right eye (5/12/2022)  -Visually significant and symptomatic left eye   -R/b/a of pterygium excision discussed, patient would like to proceed.   -Recommended UV protection and sunglasses wear  -Disc left eye. Disc R, B, PC and Options. Conj autograft, AM and tisseel (off-label).  Wait 3-6 months after the right eye.    #Refractive error   -Not interested in glasses wear at this time    Plan:  -Moxifloxacin (tan top) 3 times daily  -Prednisolone (white top, shake bottle), 3 times daily  -Ketorolac (gray top), 3 times daily  -Shield over eye 24/7 for the first week, then every night for another 2 weeks  -No eye rubbing, no swimming, for 1 month  -Wear shield.    Follow up: post op  1 week    Wil Mejia,   Fellow, Cornea & External Disease  Department of Ophthalmology  [facility]    Attending Physician Attestation:  Complete documentation of historical and exam  elements from today's encounter can be found in the full encounter summary report (not reduplicated in this progress note).  I personally obtained the chief complaint(s) and history of present illness.  I confirmed and edited as necessary the review of systems, past medical/surgical history, family history, social history, and examination findings as documented by others; and I examined the patient myself.  I personally reviewed the relevant tests, images, and reports as documented above.  I formulated and edited as necessary the assessment and plan and discussed the findings and management plan with the patient and family. - MD Freddie Galindo MD

## 2022-05-12 NOTE — PATIENT INSTRUCTIONS
Medication instructions:    -Moxifloxacin (tan top) 3 times daily  -Prednisolone (white top, shake bottle), 3 times daily  -Ketorolac (gray top), 3 times daily  -Shield over eye 24/7 for the first week, then every night for another 2 weeks  -No eye rubbing, no swimming, for 1 month

## 2022-05-12 NOTE — PROGRESS NOTES
CC: Pterygium    Referring provider: Self-referred    HPI:  Ephraim Rebolledo is a(n) 41 year old male who presents for evaluation of pterygium right eye. He notes that it has been present for many years, unsure if it has grown during this time. He believes that his vision in the right eye is more blurred than the vision in his left eye. He notes that he has had increased tearing and irritation in the right eye over the last several months. He wonders if these symptoms may be secondary to the pterygium. He also notes that he has a pterygium in the left eye, states that it is not as bothersome as the one in the right He denies new eye pain, flashes, or change in floaters.      Interval hx: POD#1 s/p pterygium excision with autograft and AMT right eye (5/12/2022).  Pt notes 5/10 pain and extensive tearing of the right eye.      POHx:  Refractive error    Glasses: None  CTL wearer: None    Family hx of eye disease: no AMD, glaucoma  Social Hx: nurse    Meds:  AT PRN    A/P:  #Pterygia, each eye   -s/p pterygium excision with autograft and AMT right eye (5/12/2022)  -Visually significant and symptomatic left eye   -R/b/a of pterygium excision discussed, patient would like to proceed.   -Recommended UV protection and sunglasses wear  -Disc left eye. Disc R, B, PC and Options. Conj autograft, AM and tisseel (off-label).  Wait 3-6 months after the right eye.    #Refractive error   -Not interested in glasses wear at this time    Plan:  -Moxifloxacin (tan top) 3 times daily  -Prednisolone (white top, shake bottle), 3 times daily  -Ketorolac (gray top), 3 times daily  -Shield over eye 24/7 for the first week, then every night for another 2 weeks  -No eye rubbing, no swimming, for 1 month  -Wear shield.    Follow up: post op  1 week    Wil Mejia,   Fellow, Cornea & External Disease  Department of Ophthalmology  [facility]    Attending Physician Attestation:  Complete documentation of historical and exam  elements from today's encounter can be found in the full encounter summary report (not reduplicated in this progress note).  I personally obtained the chief complaint(s) and history of present illness.  I confirmed and edited as necessary the review of systems, past medical/surgical history, family history, social history, and examination findings as documented by others; and I examined the patient myself.  I personally reviewed the relevant tests, images, and reports as documented above.  I formulated and edited as necessary the assessment and plan and discussed the findings and management plan with the patient and family. - MD Freddie Galindo MD

## 2022-05-12 NOTE — NURSING NOTE
Chief Complaints and History of Present Illnesses   Patient presents with     Post Op (Ophthalmology) Right Eye     EXCISION, PTERYGIUM, WITH CONJUNCTIVAL AUTOGRAFT TRANSPLANT AND AMNIOTIC MEMBRANE - RIGHT EYE 5/11/22.     Chief Complaint(s) and History of Present Illness(es)     Post Op (Ophthalmology) Right Eye     Laterality: right eye    Onset: sudden    Onset: 1 day ago    Associated symptoms: eye pain, tearing, headache and foreign body sensation.  Negative for dryness, photophobia, flashes and floaters    Pain scale: 5/10    Comments: EXCISION, PTERYGIUM, WITH CONJUNCTIVAL AUTOGRAFT TRANSPLANT AND AMNIOTIC MEMBRANE - RIGHT EYE 5/11/22.              Comments     Pt slept good last night.  Shield stayed on all night.  Pain, FBS, headache, and tearing today.  Pt did not start drops yet.    Mirta aFlcon, COT May 12, 2022 9:18 AM

## 2022-05-14 ENCOUNTER — OFFICE VISIT (OUTPATIENT)
Dept: OPHTHALMOLOGY | Facility: CLINIC | Age: 42
End: 2022-05-14
Payer: COMMERCIAL

## 2022-05-14 DIAGNOSIS — H11.003 PTERYGIUM EYE, BILATERAL: ICD-10-CM

## 2022-05-14 DIAGNOSIS — Z98.890 STATUS POST EYE SURGERY: Primary | ICD-10-CM

## 2022-05-14 PROCEDURE — 99207 PR NO BILLABLE SERVICE THIS VISIT: CPT | Mod: 25 | Performed by: OPHTHALMOLOGY

## 2022-05-14 ASSESSMENT — EXTERNAL EXAM - LEFT EYE: OS_EXAM: NORMAL

## 2022-05-14 ASSESSMENT — SLIT LAMP EXAM - LIDS
COMMENTS: NORMAL
COMMENTS: NORMAL

## 2022-05-14 ASSESSMENT — EXTERNAL EXAM - RIGHT EYE: OD_EXAM: NORMAL

## 2022-05-14 NOTE — PROGRESS NOTES
CC: POD3 Pterygium resection, AMT    HPI:  Ephraim Rebolledo is a(n) 41 year old male POD3 s/p pterygium excision with autograft and AMT right eye (5/12/2022). Pt increased burning eye pain last eye, and was unable to sleep. Is worried that his BCL may have fallen out. No blurry vision. Feels the pain every time he blinks. Has not taken anythnig for pain. Has been using his eye drops.    POHx:  Refractive error    Glasses: None  CTL wearer: None    Family hx of eye disease: no AMD, glaucoma  Social Hx: nurse    Meds:  AT PRN  -Moxifloxacin (tan top) 3 times daily  -Prednisolone (white top, shake bottle), 3 times daily  -Ketorolac (gray top), 3 times daily    A/P:  # POD3 s/p pterygium excision with autograft and AMT right eye (5/12/2022)  Surgical site looks good, healing, AMT and BCL in place  1 mm of exposed sclera nasal to the BCL edge likely causing pain with blink  Discussed increasing artificial tears for comfort  May patch eye closed for comfort as it is healing  Continue post operative eye drops as instructed  Keep POW1 appt as scheduled  May take Tylenol up to 4g a day for pain as needed    #Pterygia, in the left eye   -Visually significant and symptomatic left eye   -Recommended UV protection and sunglasses wear  -Planning on Conj autograft, AM and tisseel (off-label) 3-6 months after the right eye.    Follow up: post op  1 week    Rosanna Galan MD  PGY-3 Resident Physician  Department of Ophthalmology

## 2022-05-19 ENCOUNTER — OFFICE VISIT (OUTPATIENT)
Dept: OPHTHALMOLOGY | Facility: CLINIC | Age: 42
End: 2022-05-19
Attending: OPHTHALMOLOGY
Payer: COMMERCIAL

## 2022-05-19 DIAGNOSIS — H11.003 PTERYGIUM EYE, BILATERAL: ICD-10-CM

## 2022-05-19 DIAGNOSIS — Z98.890 STATUS POST EYE SURGERY: Primary | ICD-10-CM

## 2022-05-19 PROCEDURE — 99024 POSTOP FOLLOW-UP VISIT: CPT | Performed by: OPHTHALMOLOGY

## 2022-05-19 PROCEDURE — G0463 HOSPITAL OUTPT CLINIC VISIT: HCPCS

## 2022-05-19 ASSESSMENT — VISUAL ACUITY
OS_SC: 20/25
OD_SC: 20/30
METHOD: SNELLEN - LINEAR
OD_SC+: +2

## 2022-05-19 ASSESSMENT — SLIT LAMP EXAM - LIDS
COMMENTS: NORMAL
COMMENTS: NORMAL

## 2022-05-19 ASSESSMENT — TONOMETRY
OD_IOP_MMHG: 15
IOP_METHOD: ICARE
OS_IOP_MMHG: 13

## 2022-05-19 ASSESSMENT — EXTERNAL EXAM - RIGHT EYE: OD_EXAM: NORMAL

## 2022-05-19 ASSESSMENT — EXTERNAL EXAM - LEFT EYE: OS_EXAM: NORMAL

## 2022-05-19 NOTE — INTERVAL H&P NOTE
"CC: Pterygium     Referring provider: Self-referred     HPI:  Ephraim Rebolledo is a(n) 41 year old male who presents for evaluation of pterygium right eye. He notes that it has been present for many years, unsure if it has grown during this time. He believes that his vision in the right eye is more blurred than the vision in his left eye. He notes that he has had increased tearing and irritation in the right eye over the last several months. He wonders if these symptoms may be secondary to the pterygium. He also notes that he has a pterygium in the left eye, states that it is not as bothersome as the one in the right He denies new eye pain, flashes, or change in floaters.     POHx:  Refractive error     Glasses: None  CTL wearer: None     Family hx of eye disease: no AMD, glaucoma  Social Hx: nurse     Meds:  AT PRN    H&P by Dr Wil Mejia, DO  Report by to Freddie manzanares MD by Verbal note.    Attending Physician Attestation:  Complete documentation of historical and exam elements from today's encounter can be found in the full encounter summary report (not reduplicated in this progress note).  I personally obtained the chief complaint(s) and history of present illness.  I confirmed and edited as necessary the review of systems, past medical/surgical history, family history, social history, and examination findings as documented by others; and I examined the patient myself.  I personally reviewed the relevant tests, images, and reports as documented above.  I formulated and edited as necessary the assessment and plan and discussed the findings and management plan with the patient and family. - Freddie Manzanares MD      I have reviewed the     Clinical Conditions Present on Arrival:  Clinically Significant Risk Factors Present on Admission                   # Overweight: Estimated body mass index is 27.88 kg/m  as calculated from the following:    Height as of this encounter: 1.702 m (5' 7\").    Weight as of " this encounter: 80.7 kg (178 lb).

## 2022-05-19 NOTE — NURSING NOTE
Chief Complaints and History of Present Illnesses   Patient presents with     Post Op (Ophthalmology) Right Eye     Chief Complaint(s) and History of Present Illness(es)     Post Op (Ophthalmology) Right Eye     Laterality: right eye    Onset: weeks ago    Quality: States va is the same since last visit      Associated symptoms: eye pain (comes and goes), redness (has decreased), tearing and photophobia (+irritation comes and goes  )              Comments     Here for s/p pterygium excision with autograft and AMT right eye   Tan  TID right eye   Gray TID right eye   Whit top TID right eye   NPAT   Usha Lin COT 2:00 PM May 19, 2022

## 2022-05-19 NOTE — PROGRESS NOTES
CC: Pterygium    Referring provider: Self-referred    HPI:  Ephraim Rebolledo is a(n) 41 year old male who presents for evaluation of pterygium right eye. He notes that it has been present for many years, unsure if it has grown during this time. He believes that his vision in the right eye is more blurred than the vision in his left eye. He notes that he has had increased tearing and irritation in the right eye over the last several months. He wonders if these symptoms may be secondary to the pterygium. He also notes that he has a pterygium in the left eye, states that it is not as bothersome as the one in the right He denies new eye pain, flashes, or change in floaters.      Interval hx: POD#1 s/p pterygium excision with autograft and AMT right eye (5/12/2022).  Pt notes 5/10 pain and extensive tearing of the right eye.      POHx:  Refractive error    Glasses: None  CTL wearer: None    Family hx of eye disease: no AMD, glaucoma  Social Hx: nurse    Meds:  AT PRN    A/P:  #Pterygia, each eye   -s/p pterygium excision with autograft and AMT right eye (5/12/2022)  -Visually significant and symptomatic left eye   -R/b/a of pterygium excision discussed, patient would like to proceed.   -Recommended UV protection and sunglasses wear  -Disc left eye. Disc R, B, PC and Options. Conj autograft, AM and tisseel (off-label).  Wait 3-6 months after the right eye.    #Refractive error   -Not interested in glasses wear at this time    Plan:  -Moxifloxacin (tan top) 3 times daily  -Prednisolone (white top, shake bottle), 3 times daily  -Ketorolac (gray top), 3 times daily  -Shield over eye 24/7 for the first week, then every night for another 2 weeks  -No eye rubbing, no swimming, for 1 month  -Wear shield.  Use PFAT at least qid right eye.    Follow up: post op  1 week    Gaona MD  Attending Physician Attestation:  Complete documentation of historical and exam elements from today's encounter can be found in the full  encounter summary report (not reduplicated in this progress note).  I personally obtained the chief complaint(s) and history of present illness.  I confirmed and edited as necessary the review of systems, past medical/surgical history, family history, social history, and examination findings as documented by others; and I examined the patient myself.  I personally reviewed the relevant tests, images, and reports as documented above.  I formulated and edited as necessary the assessment and plan and discussed the findings and management plan with the patient and family. - Freddie Cleary MD

## 2022-06-06 ENCOUNTER — OFFICE VISIT (OUTPATIENT)
Dept: FAMILY MEDICINE | Facility: CLINIC | Age: 42
End: 2022-06-06
Payer: COMMERCIAL

## 2022-06-06 VITALS
OXYGEN SATURATION: 100 % | SYSTOLIC BLOOD PRESSURE: 146 MMHG | BODY MASS INDEX: 28.6 KG/M2 | HEIGHT: 67 IN | HEART RATE: 69 BPM | RESPIRATION RATE: 20 BRPM | DIASTOLIC BLOOD PRESSURE: 80 MMHG | WEIGHT: 182.2 LBS | TEMPERATURE: 98.2 F

## 2022-06-06 DIAGNOSIS — Z13.1 SCREENING FOR DIABETES MELLITUS: ICD-10-CM

## 2022-06-06 DIAGNOSIS — I10 BENIGN ESSENTIAL HYPERTENSION: Primary | ICD-10-CM

## 2022-06-06 DIAGNOSIS — Z13.220 LIPID SCREENING: ICD-10-CM

## 2022-06-06 PROCEDURE — 80061 LIPID PANEL: CPT | Performed by: FAMILY MEDICINE

## 2022-06-06 PROCEDURE — 99214 OFFICE O/P EST MOD 30 MIN: CPT | Performed by: FAMILY MEDICINE

## 2022-06-06 PROCEDURE — 80048 BASIC METABOLIC PNL TOTAL CA: CPT | Performed by: FAMILY MEDICINE

## 2022-06-06 PROCEDURE — 36415 COLL VENOUS BLD VENIPUNCTURE: CPT | Performed by: FAMILY MEDICINE

## 2022-06-06 RX ORDER — AMLODIPINE BESYLATE 10 MG/1
10 TABLET ORAL DAILY
Qty: 30 TABLET | Refills: 1 | Status: SHIPPED | OUTPATIENT
Start: 2022-06-06 | End: 2022-07-18

## 2022-06-06 RX ORDER — AMLODIPINE BESYLATE 5 MG/1
5 TABLET ORAL DAILY
Qty: 90 TABLET | Refills: 0 | Status: CANCELLED | OUTPATIENT
Start: 2022-06-06

## 2022-06-06 ASSESSMENT — PAIN SCALES - GENERAL: PAINLEVEL: NO PAIN (0)

## 2022-06-06 NOTE — PROGRESS NOTES
"  Assessment & Plan     Benign essential hypertension    Patient's hypertension is uncontrolled.  We will increase his Norvasc from 5 mg to 10 mg daily.  We will have him follow-up for recheck in 1 month    - amLODIPine (NORVASC) 10 MG tablet; Take 1 tablet (10 mg) by mouth daily  - Nutrition Referral; Future    Lipid screening    - Lipid panel reflex to direct LDL Non-fasting; Future  - Lipid panel reflex to direct LDL Non-fasting    Screening for diabetes mellitus    - Basic metabolic panel  (Ca, Cl, CO2, Creat, Gluc, K, Na, BUN); Future  - Basic metabolic panel  (Ca, Cl, CO2, Creat, Gluc, K, Na, BUN)    30 minutes spent on the date of the encounter doing chart review, history and exam, documentation and further activities per the note      Return in about 4 weeks (around 7/4/2022) for BP Recheck.    Lauren Varner DO  [facility]    Hung Singh is a 41 year old who presents for the following health issues     History of Present Illness       Hypertension: He presents for follow up of hypertension.  He does check blood pressure  regularly outside of the clinic. Outside blood pressures have been over 140/90. He follows a low salt diet.     156/107 this morning      -- Referral to Dietician         Review of Systems   Constitutional, HEENT, cardiovascular, pulmonary, gi and gu systems are negative, except as otherwise noted.      Objective    BP (!) 156/92 (BP Location: Right arm, Patient Position: Sitting, Cuff Size: Adult Regular)   Pulse 69   Temp 98.2  F (36.8  C) (Oral)   Resp 20   Ht 1.702 m (5' 7\")   Wt 82.6 kg (182 lb 3.2 oz)   SpO2 100%   BMI 28.54 kg/m    Body mass index is 28.54 kg/m .  Physical Exam   GENERAL: healthy, alert and no distress  NECK: no adenopathy, no asymmetry, masses, or scars and thyroid normal to palpation  RESP: lungs clear to auscultation - no rales, rhonchi or wheezes  CV: regular rate and rhythm, normal S1 S2, no S3 or S4, no murmur, click or " rub, no peripheral edema and peripheral pulses strong  MS: no gross musculoskeletal defects noted, no edema  PSYCH: mentation appears normal, affect normal/bright

## 2022-06-07 LAB
ANION GAP SERPL CALCULATED.3IONS-SCNC: 3 MMOL/L (ref 3–14)
BUN SERPL-MCNC: 13 MG/DL (ref 7–30)
CALCIUM SERPL-MCNC: 9.4 MG/DL (ref 8.5–10.1)
CHLORIDE BLD-SCNC: 106 MMOL/L (ref 94–109)
CHOLEST SERPL-MCNC: 181 MG/DL
CO2 SERPL-SCNC: 32 MMOL/L (ref 20–32)
CREAT SERPL-MCNC: 0.86 MG/DL (ref 0.66–1.25)
FASTING STATUS PATIENT QL REPORTED: NO
GFR SERPL CREATININE-BSD FRML MDRD: >90 ML/MIN/1.73M2
GLUCOSE BLD-MCNC: 111 MG/DL (ref 70–99)
HDLC SERPL-MCNC: 67 MG/DL
LDLC SERPL CALC-MCNC: 98 MG/DL
NONHDLC SERPL-MCNC: 114 MG/DL
POTASSIUM BLD-SCNC: 3.2 MMOL/L (ref 3.4–5.3)
SODIUM SERPL-SCNC: 141 MMOL/L (ref 133–144)
TRIGL SERPL-MCNC: 80 MG/DL

## 2022-06-14 ENCOUNTER — OFFICE VISIT (OUTPATIENT)
Dept: OPHTHALMOLOGY | Facility: CLINIC | Age: 42
End: 2022-06-14
Attending: OPHTHALMOLOGY
Payer: COMMERCIAL

## 2022-06-14 DIAGNOSIS — Z98.890 STATUS POST EYE SURGERY: ICD-10-CM

## 2022-06-14 DIAGNOSIS — Z98.890 POSTOPERATIVE EYE STATE: Primary | ICD-10-CM

## 2022-06-14 DIAGNOSIS — H11.003 PTERYGIUM EYE, BILATERAL: ICD-10-CM

## 2022-06-14 DIAGNOSIS — H11.052 PROGRESSIVE PERIPHERAL PTERYGIUM OF LEFT EYE: Primary | ICD-10-CM

## 2022-06-14 PROCEDURE — 99024 POSTOP FOLLOW-UP VISIT: CPT | Performed by: OPHTHALMOLOGY

## 2022-06-14 PROCEDURE — G0463 HOSPITAL OUTPT CLINIC VISIT: HCPCS

## 2022-06-14 RX ORDER — PREDNISOLONE ACETATE 10 MG/ML
1-2 SUSPENSION/ DROPS OPHTHALMIC DAILY
Qty: 10 ML | Refills: 0 | Status: SHIPPED | OUTPATIENT
Start: 2022-06-14 | End: 2022-07-14

## 2022-06-14 ASSESSMENT — TONOMETRY
OD_IOP_MMHG: 19
IOP_METHOD: TONOPEN
OS_IOP_MMHG: 14

## 2022-06-14 ASSESSMENT — VISUAL ACUITY
OS_SC+: -2
OD_PH_SC+: -2
OS_SC: 20/50
OD_SC+: -1
OS_PH_SC: 20/30
OD_SC: 20/60
OS_PH_SC+: +2
OD_PH_SC: 20/30
METHOD: SNELLEN - LINEAR

## 2022-06-14 ASSESSMENT — EXTERNAL EXAM - LEFT EYE: OS_EXAM: NORMAL

## 2022-06-14 ASSESSMENT — SLIT LAMP EXAM - LIDS
COMMENTS: NORMAL
COMMENTS: NORMAL

## 2022-06-14 ASSESSMENT — EXTERNAL EXAM - RIGHT EYE: OD_EXAM: NORMAL

## 2022-06-14 NOTE — PROGRESS NOTES
CC: Pterygium    Referring provider: Self-referred    HPI:  Ephraim Rebolledo is a(n) 41 year old male who presents for evaluation of pterygium right eye. He notes that it has been present for many years, unsure if it has grown during this time. He believes that his vision in the right eye is more blurred than the vision in his left eye. He notes that he has had increased tearing and irritation in the right eye over the last several months. He wonders if these symptoms may be secondary to the pterygium. He also notes that he has a pterygium in the left eye, states that it is not as bothersome as the one in the right He denies new eye pain, flashes, or change in floaters.      Interval hx: POD#1 s/p pterygium excision with autograft and AMT right eye (5/12/2022).  Pt notes 5/10 pain and extensive tearing of the right eye.      POHx:  Refractive error    Glasses: None  CTL wearer: None    Family hx of eye disease: no AMD, glaucoma  Social Hx: nurse    Meds:  AT [facility]    A/P:  #Pterygia, each eye   -s/p pterygium excision with autograft and AMT right eye (5/12/2022)  -Visually significant and symptomatic left eye   -R/b/a of pterygium excision discussed, patient would like to proceed.   -Recommended UV protection and sunglasses wear  -Disc left eye. Disc R, B, PC and Options. Conj autograft, AM and tisseel (off-label).  Wait 3-6 months after the right eye.    #Refractive error   -Not interested in glasses wear at this time    Plan:  -Stop Moxifloxacin (tan top) 3 times daily  -Decrease Prednisolone (white top, shake bottle), 1 times daily  -Stop Ketorolac (gray top), 3 times daily  -No Shield over eye 24/7 for the first week, then every night for another 2 weeks  -No eye rubbing, no swimming, for 1 month  -Wear shield.  Use PFAT at least qid right eye.  - removed CL    Follow up: post op - pt interested in left eye nasal pterygium excision with conj autograft and AM. 1 [place] [facility] [place]      Gaona MD  Attending  Physician Attestation:  Complete documentation of historical and exam elements from today's encounter can be found in the full encounter summary report (not reduplicated in this progress note).  I personally obtained the chief complaint(s) and history of present illness.  I confirmed and edited as necessary the review of systems, past medical/surgical history, family history, social history, and examination findings as documented by others; and I examined the patient myself.  I personally reviewed the relevant tests, images, and reports as documented above.  I formulated and edited as necessary the assessment and plan and discussed the findings and management plan with the patient and family. - Freddie Cleary MD

## 2022-06-14 NOTE — NURSING NOTE
Chief Complaints and History of Present Illnesses   Patient presents with     Post Op (Ophthalmology) Right Eye     Post Pterygium Excision, Conjunctival Autograft and Amniotic Membrane Transplantation on 05/11/2022 right eye     Chief Complaint(s) and History of Present Illness(es)     Post Op (Ophthalmology) Right Eye     Laterality: right eye    Course: stable    Associated symptoms: eye pain (right eye, intermittent), tearing and burning (both eyes, usually in the evenings, usually involves tearing).  Negative for redness    Treatments tried: eye drops and artificial tears    Pain scale: 0/10 (None currently)    Comments: Post Pterygium Excision, Conjunctival Autograft and Amniotic Membrane Transplantation on 05/11/2022 right eye              Comments     He states that his vision has seemed stable in both eyes, since his last eye exam.   He gets an occasional pain in his right eye and has had episodes of both eyes burning and tearing in the evenings.     He is using:  -Moxifloxacin 3 times daily (ran out 3 days ago)  -Prednisolone  3 times daily  -Ketorolac, 3 times daily  -PF artificial tears 4 times a day    JAY Fong 12:56 PM  June 14, 2022

## 2022-06-27 ENCOUNTER — TELEPHONE (OUTPATIENT)
Dept: OPHTHALMOLOGY | Facility: CLINIC | Age: 42
End: 2022-06-27

## 2022-07-06 ENCOUNTER — TELEPHONE (OUTPATIENT)
Dept: OPHTHALMOLOGY | Facility: CLINIC | Age: 42
End: 2022-07-06

## 2022-07-08 NOTE — TELEPHONE ENCOUNTER
LVM to schedule surgery, also let pt know that if he had a date in mind he could also let us know the time frame he was thinking and we could call him closer to the time he was wanting to schedule.     Zee Bautista on 7/8/2022 at 1:16 PM

## 2022-07-18 ENCOUNTER — OFFICE VISIT (OUTPATIENT)
Dept: FAMILY MEDICINE | Facility: CLINIC | Age: 42
End: 2022-07-18
Payer: COMMERCIAL

## 2022-07-18 VITALS
SYSTOLIC BLOOD PRESSURE: 126 MMHG | RESPIRATION RATE: 22 BRPM | WEIGHT: 179.2 LBS | HEIGHT: 67 IN | BODY MASS INDEX: 28.12 KG/M2 | OXYGEN SATURATION: 100 % | DIASTOLIC BLOOD PRESSURE: 82 MMHG | TEMPERATURE: 97.4 F | HEART RATE: 75 BPM

## 2022-07-18 DIAGNOSIS — E87.6 HYPOKALEMIA: ICD-10-CM

## 2022-07-18 DIAGNOSIS — R07.9 CHEST PAIN, UNSPECIFIED TYPE: ICD-10-CM

## 2022-07-18 DIAGNOSIS — I10 BENIGN ESSENTIAL HYPERTENSION: Primary | ICD-10-CM

## 2022-07-18 PROCEDURE — 99214 OFFICE O/P EST MOD 30 MIN: CPT | Performed by: FAMILY MEDICINE

## 2022-07-18 PROCEDURE — 36415 COLL VENOUS BLD VENIPUNCTURE: CPT | Performed by: FAMILY MEDICINE

## 2022-07-18 PROCEDURE — 84132 ASSAY OF SERUM POTASSIUM: CPT | Performed by: FAMILY MEDICINE

## 2022-07-18 PROCEDURE — 93000 ELECTROCARDIOGRAM COMPLETE: CPT | Performed by: FAMILY MEDICINE

## 2022-07-18 RX ORDER — AMLODIPINE BESYLATE 10 MG/1
10 TABLET ORAL DAILY
Qty: 90 TABLET | Refills: 1 | Status: SHIPPED | OUTPATIENT
Start: 2022-07-18 | End: 2023-02-24

## 2022-07-18 ASSESSMENT — PAIN SCALES - GENERAL: PAINLEVEL: NO PAIN (0)

## 2022-07-18 NOTE — PROGRESS NOTES
"  Assessment & Plan     Benign essential hypertension  The current medical regimen is effective;  continue present plan and medications.    - amLODIPine (NORVASC) 10 MG tablet; Take 1 tablet (10 mg) by mouth daily    Chest pain, unspecified type  Normal EKG.  This chest pain was nonexertional and occurred after he increased his Norvasc.  Mostly cramping as he also has low potassium.  If this reoccurs would recommend a stress test  - EKG 12-lead complete w/read - Clinics    Hypokalemia  He has been trying to eat more fruits and vegetables we will recheck lab today.  He is not on medications that would lower his potassium  - Potassium; Future      30 minutes spent on the date of the encounter doing chart review, history and exam, documentation and further activities per the note       Regular exercise    No follow-ups on file.    Lauren Varner DO  [facility]    Hung Singh is a 41 year old accompanied by his None, presenting for the following health issues:  No chief complaint on file.      History of Present Illness       Hypertension: He presents for follow up of hypertension.  He does check blood pressure  regularly outside of the clinic. Outside blood pressures have been over 140/90. He follows a low salt diet.      The patient's amlodipine was increased from 5 mg to 10 mg about a month ago for uncontrolled hypertension.    He had mild hypokalemia at his last appointment a month ago.  He was instructed to increase his potassium consumption and we will recheck today.    Review of Systems   Constitutional, HEENT, cardiovascular, pulmonary, gi and gu systems are negative, except as otherwise noted.      Objective    /82 (BP Location: Right arm, Patient Position: Sitting, Cuff Size: Adult Regular)   Pulse 75   Temp 97.4  F (36.3  C) (Tympanic)   Resp 22   Ht 1.702 m (5' 7\")   Wt 81.3 kg (179 lb 3.2 oz)   SpO2 100%   BMI 28.07 kg/m    Body mass index is 28.07 " kg/m .  Physical Exam   GENERAL: healthy, alert and no distress  NECK: no adenopathy, no asymmetry, masses, or scars and thyroid normal to palpation  RESP: lungs clear to auscultation - no rales, rhonchi or wheezes  CV: regular rate and rhythm, normal S1 S2, no S3 or S4, no murmur, click or rub, no peripheral edema and peripheral pulses strong  MS: no gross musculoskeletal defects noted, no edema  PSYCH: mentation appears normal, affect normal/bright            .  ..

## 2022-07-19 LAB — POTASSIUM BLD-SCNC: 3.6 MMOL/L (ref 3.4–5.3)

## 2022-08-26 ENCOUNTER — TELEPHONE (OUTPATIENT)
Dept: OPHTHALMOLOGY | Facility: CLINIC | Age: 42
End: 2022-08-26

## 2022-08-26 NOTE — TELEPHONE ENCOUNTER
I called patient to schedule surgery with Dr. Freddie Cleary, I left a voicemail with callback # [phone]

## 2022-09-28 ENCOUNTER — TELEPHONE (OUTPATIENT)
Dept: OPHTHALMOLOGY | Facility: CLINIC | Age: 42
End: 2022-09-28

## 2022-09-28 NOTE — TELEPHONE ENCOUNTER
Billak is going to [place] in December and would like to schedule when he returns in January. Postponing case until January.

## 2022-12-02 ENCOUNTER — NURSE TRIAGE (OUTPATIENT)
Dept: FAMILY MEDICINE | Facility: CLINIC | Age: 42
End: 2022-12-02

## 2022-12-02 NOTE — TELEPHONE ENCOUNTER
No appointment available within 3 days- pt ok to wait till 12/12 with advice that he should go to the [facility] in [place] for further assessment. Hours on Saturday are 8am-noon.  He will cancel appointment if he decides to go there instead.      Reason for Disposition    MODERATE pain (e.g., interferes with normal activities) and present > 3 days    Additional Information    Negative: Shock suspected (e.g., cold/pale/clammy skin, too weak to stand, low BP, rapid pulse)    Negative: Similar pain previously and it was from 'heart attack'    Negative: Similar pain previously from 'angina' and not relieved by nitroglycerin    Negative: Sounds like a life-threatening emergency to the triager    Negative: Followed an injury to arm    Negative: Chest pain    Negative: Wound looks infected    Negative: Elbow pain is main symptom    Negative: Hand or wrist pain is main symptom    Negative: Difficulty breathing or unusual sweating (e.g., sweating without exertion)    Negative: Chest pain lasting longer than 5 minutes    Negative: Age > 40 and no obvious cause for pain, pain still present even when not moving the arm    Negative: Fever and red area (or area very tender to touch)    Negative: Swollen joint and fever    Negative: Entire arm is swollen    Negative: Patient sounds very sick or weak to the triager    Negative: SEVERE pain (e.g., excruciating, unable to do any normal activities)    Negative: Red area or streak > 2 inches (or 5 cm)    Negative: Cast on wrist or arm and now increasing pain    Negative: Weakness (i.e., loss of strength) in hand or fingers    Negative: Arm pains with exertion (e.g., occurs with walking; goes away on resting)    Negative: Painful rash with multiple small blisters grouped together (i.e., dermatomal distribution or 'band' or 'stripe')    Negative: Looks like a boil, infected sore, deep ulcer, or other infected rash (spreading redness, pus)    Negative: Localized rash is very  "painful (no fever)    Negative: Numbness (i.e., loss of sensation) in hand or fingers    Negative: Localized pain, redness or hard lump along vein    Negative: Patient wants to be seen    Answer Assessment - Initial Assessment Questions  1. ONSET: \"When did the pain start?\"      2 weeks  2. LOCATION: \"Where is the pain located?\"      Left shoulder  3. PAIN: \"How bad is the pain?\" (Scale 1-10; or mild, moderate, severe)    - MILD (1-3): doesn't interfere with normal activities    - MODERATE (4-7): interferes with normal activities (e.g., work or school) or awakens from sleep    - SEVERE (8-10): excruciating pain, unable to do any normal activities, unable to hold a cup of water      6/10  4. WORK OR EXERCISE: \"Has there been any recent work or exercise that involved this part of the body?\"      Not related to work   5. CAUSE: \"What do you think is causing the arm pain?\"      Unsure-   6. OTHER SYMPTOMS: \"Do you have any other symptoms?\" (e.g., neck pain, swelling, rash, fever, numbness, weakness)      Neck pain as well but not constant    7. PREGNANCY: \"Is there any chance you are pregnant?\" \"When was your last menstrual period?\"      N/a    Lateral side of bicep    Protocols used: ARM PAIN-A-OH      .Jennifer RN    Triage Nurse  [facility]  Appointment line: [phone]  [facility], 24 hour nurse line, available by calling clinic at [phone] and following prompts.         "

## 2022-12-05 ENCOUNTER — ANCILLARY PROCEDURE (OUTPATIENT)
Dept: GENERAL RADIOLOGY | Facility: CLINIC | Age: 42
End: 2022-12-05
Attending: PEDIATRICS
Payer: COMMERCIAL

## 2022-12-05 ENCOUNTER — OFFICE VISIT (OUTPATIENT)
Dept: ORTHOPEDICS | Facility: CLINIC | Age: 42
End: 2022-12-05
Payer: COMMERCIAL

## 2022-12-05 VITALS
WEIGHT: 184 LBS | SYSTOLIC BLOOD PRESSURE: 153 MMHG | BODY MASS INDEX: 28.88 KG/M2 | HEIGHT: 67 IN | DIASTOLIC BLOOD PRESSURE: 78 MMHG

## 2022-12-05 DIAGNOSIS — M79.602 LEFT ARM PAIN: Primary | ICD-10-CM

## 2022-12-05 DIAGNOSIS — M25.512 PAIN IN JOINT OF LEFT SHOULDER: ICD-10-CM

## 2022-12-05 PROCEDURE — 73030 X-RAY EXAM OF SHOULDER: CPT | Mod: TC | Performed by: RADIOLOGY

## 2022-12-05 PROCEDURE — 99204 OFFICE O/P NEW MOD 45 MIN: CPT | Performed by: PEDIATRICS

## 2022-12-05 RX ORDER — METHYLPREDNISOLONE 4 MG
TABLET, DOSE PACK ORAL
Qty: 21 TABLET | Refills: 0 | Status: SHIPPED | OUTPATIENT
Start: 2022-12-05 | End: 2024-02-27

## 2022-12-05 NOTE — PROGRESS NOTES
ASSESSMENT & PLAN    Ephraim was seen today for pain.    Diagnoses and all orders for this visit:    Left arm pain  -     XR Shoulder Left G/E 3 Views; Future  -     methylPREDNISolone (MEDROL DOSEPAK) 4 MG tablet therapy pack; Follow Package Directions      This issue is acute and Unchanged.    Left arm pain without other symptoms.  Given description, concerning for nerve related or radicular pain, though cervical and shoulder exam are reassuring.    Plan:  - Today's Plan of Care:  Prescription Medication as directed: Medrol Dose Pack  Discussed activity considerations and other supportive care including Ice/Heat, OTC and other topical medications as needed.    -We also discussed other future treatment options:  Referral to Physical Therapy  Cervical X-rays  MRI of shoulder/arm if not improving    Follow Up: 1 month (pending PCP appointment)    Concerning signs and symptoms were reviewed.  The patient expressed understanding of this management plan and all questions were answered at this time.    Priti Geller MD [facility]  Sports Medicine Physician  [facility]      -----  Chief Complaint   Patient presents with     Left Shoulder - Pain       SUBJECTIVE  Ephraim Rebolledo is a/an 42 year old male who is seen as a WALK IN patient for evaluation of acute radiating left shoulder and arm pain.    The patient is seen by themselves.  The patient is Right handed    Onset: 3 week(s) ago. Reports insidious onset without acute precipitating event.  Generalized heavy & fatigued sensation left arm, sometimes burning pain    Location of Pain: left lateral upper arm  Worsened by: prolonged sitting, lying on left side  Better with: activity modification  Treatments tried: rest/activity avoidance, Tylenol and ibuprofen  Associated symptoms: arm fatigue, muscle tension intermittently in left upper trapezius    Orthopedic/Surgical history: NO  Social History/Occupation: works as oncology nurse    No family history  "pertinent to patient's problem today.    REVIEW OF SYSTEMS:  Review of Systems  Skin: no bruising, no swelling  Musculoskeletal: as above  Neurologic: no numbness, paresthesias  Remainder of review of systems is negative including constitutional, CV, pulmonary, GI, except as noted in HPI or medical history.  - Denies chest pain, difficulty breathing    OBJECTIVE:  BP (!) 153/78   Ht 1.702 m (5' 7\")   Wt 83.5 kg (184 lb)   BMI 28.82 kg/m     General: healthy, alert and in no distress  HEENT: no scleral icterus or conjunctival erythema  Skin: no suspicious lesions or rash. No jaundice.  CV: distal perfusion intact  Resp: normal respiratory effort without conversational dyspnea   Psych: normal mood and affect  Gait: NORMAL  Neuro: Normal light sensory exam of upper extremity    Cervical Spine Exam  Inspection:       No visible deformity        normal lordotic curvature maintained    Posture:      normal    Tender:      Mild trapezius tenderness    Non-Tender:      remainder of cervical spine area    Range of Motion:       Full active and passive ROM forward flexion, extension, lateral rotation, lateral flexion.    Painful Motions:      none    Strength:     C4 (shoulder shrug)  symmetric 5/5       C5 (shoulder abduction) symmetric 5/5       C6 (elbow flexion) symmetric 5/5       C7 (elbow extension) symmetric 5/5       C8 (finger abduction, thumb flexion) symmetric 5/5    Sensation:     grossly intact througout bilateral upper extremities    Special Tests:      neg (-) Spurling    Skin:     well perfused       capillary refill brisk    Lymphatics:      no edema noted in the upper extremities     Bilateral Shoulder exam    Inspection and Posture:       normal    Skin:        no visible deformities    Tender:        none    Non Tender:       remainder of shoulder bilateral    ROM:        Full active and passive ROM with flexion, extension, abduction, internal and external rotation bilateral    Painful motions:       " none    Strength:        abduction 5/5 bilateral       flexion 5/5 bilateral       internal rotation 5/5 bilateral       external rotation 5/5 bilateral    Impingement testing:       neg (-) Neer right       neg (-) Reyna right       neg (-) crossover right       neg (-) O'lillie right    Sensation:        normal sensation over shoulder and upper extremity     RADIOLOGY:  I independently ordered, visualized and reviewed these images with the patient  4 XR views of left shoulder reviewed: no acute bony abnormality, no significant degenerative change  - will follow official read      Review of the result(s) of each unique test - XR

## 2022-12-05 NOTE — LETTER
12/5/2022         RE: Ephraim Rebolledo  [address]  [place] [address]        Dear Colleague,    Thank you for referring your patient, Ephraim Rebolledo, to the [facility] [place]. Please see a copy of my visit note below.    ASSESSMENT & PLAN    Ephraim was seen today for pain.    Diagnoses and all orders for this visit:    Left arm pain  -     XR Shoulder Left G/E 3 Views; Future  -     methylPREDNISolone (MEDROL DOSEPAK) 4 MG tablet therapy pack; Follow Package Directions      This issue is acute and Unchanged.    Left arm pain without other symptoms.  Given description, concerning for nerve related or radicular pain, though cervical and shoulder exam are reassuring.    Plan:  - Today's Plan of Care:  Prescription Medication as directed: Medrol Dose Pack  Discussed activity considerations and other supportive care including Ice/Heat, OTC and other topical medications as needed.    -We also discussed other future treatment options:  Referral to Physical Therapy  Cervical X-rays  MRI of shoulder/arm if not improving    Follow Up: 1 month (pending PCP appointment)    Concerning signs and symptoms were reviewed.  The patient expressed understanding of this management plan and all questions were answered at this time.    Priti Geller MD [facility]  Sports Medicine Physician  [facility]      -----  Chief Complaint   Patient presents with     Left Shoulder - Pain       SUBJECTIVE  Ephraim Rebolledo is a/an 42 year old male who is seen as a WALK IN patient for evaluation of acute radiating left shoulder and arm pain.    The patient is seen by themselves.  The patient is Right handed    Onset: 3 week(s) ago. Reports insidious onset without acute precipitating event.  Generalized heavy & fatigued sensation left arm, sometimes burning pain    Location of Pain: left lateral upper arm  Worsened by: prolonged sitting, lying on left side  Better with: activity  "modification  Treatments tried: rest/activity avoidance, Tylenol and ibuprofen  Associated symptoms: arm fatigue, muscle tension intermittently in left upper trapezius    Orthopedic/Surgical history: NO  Social History/Occupation: works as oncology nurse    No family history pertinent to patient's problem today.    REVIEW OF SYSTEMS:  Review of Systems  Skin: no bruising, no swelling  Musculoskeletal: as above  Neurologic: no numbness, paresthesias  Remainder of review of systems is negative including constitutional, CV, pulmonary, GI, except as noted in HPI or medical history.  - Denies chest pain, difficulty breathing    OBJECTIVE:  BP (!) 153/78   Ht 1.702 m (5' 7\")   Wt 83.5 kg (184 lb)   BMI 28.82 kg/m     General: healthy, alert and in no distress  HEENT: no scleral icterus or conjunctival erythema  Skin: no suspicious lesions or rash. No jaundice.  CV: distal perfusion intact  Resp: normal respiratory effort without conversational dyspnea   Psych: normal mood and affect  Gait: NORMAL  Neuro: Normal light sensory exam of upper extremity    Cervical Spine Exam  Inspection:       No visible deformity        normal lordotic curvature maintained    Posture:      normal    Tender:      Mild trapezius tenderness    Non-Tender:      remainder of cervical spine area    Range of Motion:       Full active and passive ROM forward flexion, extension, lateral rotation, lateral flexion.    Painful Motions:      none    Strength:     C4 (shoulder shrug)  symmetric 5/5       C5 (shoulder abduction) symmetric 5/5       C6 (elbow flexion) symmetric 5/5       C7 (elbow extension) symmetric 5/5       C8 (finger abduction, thumb flexion) symmetric 5/5    Sensation:     grossly intact througout bilateral upper extremities    Special Tests:      neg (-) Spurling    Skin:     well perfused       capillary refill brisk    Lymphatics:      no edema noted in the upper extremities     Bilateral Shoulder exam    Inspection and " Posture:       normal    Skin:        no visible deformities    Tender:        none    Non Tender:       remainder of shoulder bilateral    ROM:        Full active and passive ROM with flexion, extension, abduction, internal and external rotation bilateral    Painful motions:       none    Strength:        abduction 5/5 bilateral       flexion 5/5 bilateral       internal rotation 5/5 bilateral       external rotation 5/5 bilateral    Impingement testing:       neg (-) Neer right       neg (-) Reyna right       neg (-) crossover right       neg (-) O'lillie right    Sensation:        normal sensation over shoulder and upper extremity     RADIOLOGY:  I independently ordered, visualized and reviewed these images with the patient  4 XR views of left shoulder reviewed: no acute bony abnormality, no significant degenerative change  - will follow official read      Review of the result(s) of each unique test - XR             Again, thank you for allowing me to participate in the care of your patient.        Sincerely,        Priti Geller MD

## 2022-12-05 NOTE — Clinical Note
I recommended this patient still follow up with you. His MSK exam is pretty reassuring. Given the description of burning pain I presumptively treated for cervical radiculopathy with medrol dose pack, however, could be non -MSK (though his ROS was completely normal). Let me know your thoughts after seeing him, happy to refer to PT or re-evaluate for possible MRI. Priti Review of Systems/Medical History  Patient summary reviewed  Chart reviewed      Cardiovascular  Negative cardio ROS Hypertension controlled,    Pulmonary  Negative pulmonary ROS        GI/Hepatic  Negative GI/hepatic ROS          Negative  ROS        Endo/Other  Negative endo/other ROS      GYN  Negative gynecology ROS          Hematology  Negative hematology ROS      Musculoskeletal  Negative musculoskeletal ROS        Neurology  Negative neurology ROS      Psychology   Negative psychology ROS              Physical Exam    Airway    Mallampati score: II  TM Distance: >3 FB  Neck ROM: full     Dental   No notable dental hx     Cardiovascular  Comment: Negative ROS, Rhythm: regular, Rate: normal, Cardiovascular exam normal    Pulmonary  Pulmonary exam normal Breath sounds clear to auscultation,     Other Findings        Anesthesia Plan  ASA Score- 1     Anesthesia Type- IV sedation with anesthesia with ASA Monitors  Additional Monitors:   Airway Plan:         Plan Factors-    Induction- intravenous  Postoperative Plan- Plan for postoperative opioid use  Informed Consent- Anesthetic plan and risks discussed with patient  I personally reviewed this patient with the CRNA  Discussed and agreed on the Anesthesia Plan with the CRNA  Frances Alfaro

## 2022-12-05 NOTE — PATIENT INSTRUCTIONS
Left arm pain without other symptoms.  Given description, concerning for nerve related or radicular pain, though cervical and shoulder exam are reassuring.    Plan:  - Today's Plan of Care:  Prescription Medication as directed: Medrol Dose Pack  Discussed activity considerations and other supportive care including Ice/Heat, OTC and other topical medications as needed.    -We also discussed other future treatment options:  Referral to Physical Therapy  Cervical X-rays  MRI of shoulder/arm if not improving    Follow Up: 1 month (pending PCP appointment)    If you have any further questions for your physician or physician s care team you can call [phone] and use option 3 to leave a voice message.

## 2022-12-12 ENCOUNTER — OFFICE VISIT (OUTPATIENT)
Dept: FAMILY MEDICINE | Facility: CLINIC | Age: 42
End: 2022-12-12
Payer: COMMERCIAL

## 2022-12-12 VITALS
HEIGHT: 67 IN | HEART RATE: 82 BPM | DIASTOLIC BLOOD PRESSURE: 80 MMHG | BODY MASS INDEX: 28.44 KG/M2 | WEIGHT: 181.2 LBS | OXYGEN SATURATION: 99 % | RESPIRATION RATE: 20 BRPM | TEMPERATURE: 97.8 F | SYSTOLIC BLOOD PRESSURE: 140 MMHG

## 2022-12-12 DIAGNOSIS — M54.12 CERVICAL RADICULOPATHY: Primary | ICD-10-CM

## 2022-12-12 PROCEDURE — 99214 OFFICE O/P EST MOD 30 MIN: CPT | Performed by: FAMILY MEDICINE

## 2022-12-12 ASSESSMENT — PAIN SCALES - GENERAL: PAINLEVEL: MILD PAIN (2)

## 2022-12-12 NOTE — PROGRESS NOTES
"  Assessment & Plan     Cervical radiculopathy  We will get imaging to confirm diagnosis may need steroid injection in the cervical spine.  He was on oral steroids which helped slightly.  - MR Cervical Spine w/o Contrast; Future      30 minutes spent on the date of the encounter doing chart review, history and exam, documentation and further activities per the note       BMI:   Estimated body mass index is 28.38 kg/m  as calculated from the following:    Height as of this encounter: 1.702 m (5' 7\").    Weight as of this encounter: 82.2 kg (181 lb 3.2 oz).         No follow-ups on file.    Lauren Varner [facility] SANDRA Singh is a 42 year old, presenting for the following health issues:  Shoulder left      Shoulder left    History of Present Illness       Reason for visit:  Arm pain    He eats 2-3 servings of fruits and vegetables daily.He consumes 1 sweetened beverage(s) daily.He exercises with enough effort to increase his heart rate 9 or less minutes per day.  He exercises with enough effort to increase his heart rate 3 or less days per week. He is missing 1 dose(s) of medications per week.     Follow up on left shoulder/ arm pain, saw Sports Medicine on 12/5/22. A little bit better , just finished medrol luzmaria last night and pain is coming back           Review of Systems   Constitutional, HEENT, cardiovascular, pulmonary, gi and gu systems are negative, except as otherwise noted.      Objective    BP (!) 146/84 (BP Location: Right arm, Patient Position: Sitting, Cuff Size: Adult Large)   Pulse 82   Temp 97.8  F (36.6  C) (Oral)   Resp 20   Ht 1.702 m (5' 7\")   Wt 82.2 kg (181 lb 3.2 oz)   SpO2 99%   BMI 28.38 kg/m    Body mass index is 28.38 kg/m .  Physical Exam   GENERAL: healthy, alert and no distress  MS: neck exam shows normal strength, no torticollis and ROM is normal and Non tender over biceps tendon, deltoid tendon and rotator cuff, full painless ROM, good " strength of rotator cuff  SKIN: no suspicious lesions or rashes  NEURO: Normal strength and tone, mentation intact and speech normal  PSYCH: mentation appears normal, affect normal/bright

## 2022-12-29 ENCOUNTER — ANCILLARY PROCEDURE (OUTPATIENT)
Dept: MRI IMAGING | Facility: CLINIC | Age: 42
End: 2022-12-29
Attending: FAMILY MEDICINE
Payer: COMMERCIAL

## 2022-12-29 DIAGNOSIS — M54.12 CERVICAL RADICULOPATHY: ICD-10-CM

## 2022-12-29 PROCEDURE — 72141 MRI NECK SPINE W/O DYE: CPT | Performed by: STUDENT IN AN ORGANIZED HEALTH CARE EDUCATION/TRAINING PROGRAM

## 2023-01-19 ENCOUNTER — TELEPHONE (OUTPATIENT)
Dept: OPHTHALMOLOGY | Facility: CLINIC | Age: 43
End: 2023-01-19
Payer: COMMERCIAL

## 2023-01-19 NOTE — TELEPHONE ENCOUNTER
Reached out to schedule Francisco as requested in January 2023. Francisco said his [place] trip had been delayed and he is currently in the airport about to board his flight to head there now. Francisco said he will call to schedule when he returns.

## 2023-02-24 DIAGNOSIS — I10 BENIGN ESSENTIAL HYPERTENSION: ICD-10-CM

## 2023-02-24 RX ORDER — AMLODIPINE BESYLATE 10 MG/1
10 TABLET ORAL DAILY
Qty: 90 TABLET | Refills: 1 | Status: SHIPPED | OUTPATIENT
Start: 2023-02-24 | End: 2023-10-05

## 2023-06-02 ENCOUNTER — HEALTH MAINTENANCE LETTER (OUTPATIENT)
Age: 43
End: 2023-06-02

## 2023-10-05 DIAGNOSIS — I10 BENIGN ESSENTIAL HYPERTENSION: ICD-10-CM

## 2023-10-05 RX ORDER — AMLODIPINE BESYLATE 10 MG/1
10 TABLET ORAL DAILY
Qty: 90 TABLET | Refills: 0 | Status: SHIPPED | OUTPATIENT
Start: 2023-10-05 | End: 2024-01-15

## 2024-01-15 DIAGNOSIS — I10 BENIGN ESSENTIAL HYPERTENSION: ICD-10-CM

## 2024-01-15 RX ORDER — AMLODIPINE BESYLATE 10 MG/1
10 TABLET ORAL DAILY
Qty: 30 TABLET | Refills: 0 | Status: SHIPPED | OUTPATIENT
Start: 2024-01-15 | End: 2024-02-27

## 2024-01-15 NOTE — TELEPHONE ENCOUNTER
Patient calling to request refill of his Amlodipine medication.     Noted he has upcoming appointment on 2/27, soonest available for preventative adult visit.    BLACK VarelaN RN  [facility]

## 2024-02-27 ENCOUNTER — OFFICE VISIT (OUTPATIENT)
Dept: FAMILY MEDICINE | Facility: CLINIC | Age: 44
End: 2024-02-27
Payer: COMMERCIAL

## 2024-02-27 VITALS
WEIGHT: 186 LBS | OXYGEN SATURATION: 100 % | DIASTOLIC BLOOD PRESSURE: 76 MMHG | HEART RATE: 79 BPM | TEMPERATURE: 98.3 F | RESPIRATION RATE: 16 BRPM | BODY MASS INDEX: 29.19 KG/M2 | SYSTOLIC BLOOD PRESSURE: 134 MMHG | HEIGHT: 67 IN

## 2024-02-27 DIAGNOSIS — Z13.220 LIPID SCREENING: ICD-10-CM

## 2024-02-27 DIAGNOSIS — R51.9 MORNING HEADACHE: ICD-10-CM

## 2024-02-27 DIAGNOSIS — Z00.00 ROUTINE GENERAL MEDICAL EXAMINATION AT A HEALTH CARE FACILITY: Primary | ICD-10-CM

## 2024-02-27 DIAGNOSIS — M54.2 NECK PAIN: ICD-10-CM

## 2024-02-27 DIAGNOSIS — M22.2X2 PATELLOFEMORAL PAIN SYNDROME OF LEFT KNEE: ICD-10-CM

## 2024-02-27 DIAGNOSIS — I10 BENIGN ESSENTIAL HYPERTENSION: ICD-10-CM

## 2024-02-27 LAB
ERYTHROCYTE [DISTWIDTH] IN BLOOD BY AUTOMATED COUNT: 14.3 % (ref 10–15)
HCT VFR BLD AUTO: 41.2 % (ref 40–53)
HGB BLD-MCNC: 12.6 G/DL (ref 13.3–17.7)
HOLD SPECIMEN: NORMAL
MCH RBC QN AUTO: 26.8 PG (ref 26.5–33)
MCHC RBC AUTO-ENTMCNC: 30.6 G/DL (ref 31.5–36.5)
MCV RBC AUTO: 88 FL (ref 78–100)
PLATELET # BLD AUTO: 274 10E3/UL (ref 150–450)
RBC # BLD AUTO: 4.71 10E6/UL (ref 4.4–5.9)
WBC # BLD AUTO: 5.6 10E3/UL (ref 4–11)

## 2024-02-27 PROCEDURE — 80061 LIPID PANEL: CPT | Performed by: FAMILY MEDICINE

## 2024-02-27 PROCEDURE — 99396 PREV VISIT EST AGE 40-64: CPT | Performed by: FAMILY MEDICINE

## 2024-02-27 PROCEDURE — 80048 BASIC METABOLIC PNL TOTAL CA: CPT | Performed by: FAMILY MEDICINE

## 2024-02-27 PROCEDURE — 85027 COMPLETE CBC AUTOMATED: CPT | Performed by: FAMILY MEDICINE

## 2024-02-27 PROCEDURE — 36415 COLL VENOUS BLD VENIPUNCTURE: CPT | Performed by: FAMILY MEDICINE

## 2024-02-27 RX ORDER — AMLODIPINE BESYLATE 10 MG/1
10 TABLET ORAL DAILY
Qty: 90 TABLET | Refills: 1 | Status: SHIPPED | OUTPATIENT
Start: 2024-02-27 | End: 2024-09-20

## 2024-02-27 RX ORDER — CYCLOBENZAPRINE HCL 10 MG
5-10 TABLET ORAL
Qty: 20 TABLET | Refills: 1 | Status: SHIPPED | OUTPATIENT
Start: 2024-02-27

## 2024-02-27 SDOH — HEALTH STABILITY: PHYSICAL HEALTH: ON AVERAGE, HOW MANY MINUTES DO YOU ENGAGE IN EXERCISE AT THIS LEVEL?: 30 MIN

## 2024-02-27 SDOH — HEALTH STABILITY: PHYSICAL HEALTH: ON AVERAGE, HOW MANY DAYS PER WEEK DO YOU ENGAGE IN MODERATE TO STRENUOUS EXERCISE (LIKE A BRISK WALK)?: 2 DAYS

## 2024-02-27 ASSESSMENT — SOCIAL DETERMINANTS OF HEALTH (SDOH): HOW OFTEN DO YOU GET TOGETHER WITH FRIENDS OR RELATIVES?: ONCE A WEEK

## 2024-02-27 ASSESSMENT — PAIN SCALES - GENERAL: PAINLEVEL: MILD PAIN (2)

## 2024-02-27 NOTE — PROGRESS NOTES
"Preventive Care Visit  [facility]  Lauren DO [place], Family Medicine  Feb 27, 2024    Assessment & Plan     Routine general medical examination at a health care facility      Benign essential hypertension  The patient did not take his Norvasc today.  He will check his blood pressure more at home and let me know how it is while he is taking his medication  - amLODIPine (NORVASC) 10 MG tablet; Take 1 tablet (10 mg) by mouth daily Needs appointment  - Basic metabolic panel  (Ca, Cl, CO2, Creat, Gluc, K, Na, BUN); Future    Neck pain  The patient has a very flat pillow.  He will get a new pillow and take Flexeril as needed for neck pain  - cyclobenzaprine (FLEXERIL) 10 MG tablet; Take 0.5-1 tablets (5-10 mg) by mouth nightly as needed for muscle spasms (neck pain)    Morning headache  Likely related to his neck pain.  But is also common with TMJ.  I recommended the patient go to the dentist for evaluation and routine cleaning  - cyclobenzaprine (FLEXERIL) 10 MG tablet; Take 0.5-1 tablets (5-10 mg) by mouth nightly as needed for muscle spasms (neck pain)    Patellofemoral pain syndrome of left knee  Handout with exercises given      Lipid screening    - Lipid panel reflex to direct LDL Fasting; Future      BMI  Estimated body mass index is 29.13 kg/m  as calculated from the following:    Height as of this encounter: 1.702 m (5' 7\").    Weight as of this encounter: 84.4 kg (186 lb).       Counseling  Appropriate preventive services were discussed with this patient, including applicable screening as appropriate for fall prevention, nutrition, physical activity, Tobacco-use cessation, weight loss and cognition.  Checklist reviewing preventive services available has been given to the patient.  Reviewed patient's diet, addressing concerns and/or questions.   He is at risk for lack of exercise and has been provided with information to increase physical activity for the benefit of his well-being.   The " patient was instructed to see the dentist every 6 months.       Follow-up in 6 months for hypertension sooner if blood pressure is not in the normal range    Subjective   Francisco is a 43 year old, presenting for the following:  Physical        2/27/2024     8:43 AM   Additional Questions   Roomed by Fidelia KUMARI   Accompanied by self         2/27/2024     8:43 AM   Patient Reported Additional Medications   Patient reports taking the following new medications none        Health Care Directive  Patient does not have a Health Care Directive or Living Will: Discussed advance care planning with patient; however, patient declined at this time.    HPI      -- Has had a headache for the past week, Tylenol helps a little but it comes right back, thinks he had Imitrex in the past ? But never had to use it.  The pain started in his neck and is up to the right frontal.  He wakes up with headache.  He takes tylenol which takes it away.  He doesn't think he is grinding his teeth.  He is not snoring.   He thinks his pillow is not firm enough.         Hypertension Follow-up    Do you check your blood pressure regularly outside of the clinic? No - but ran out of meds x 2 days now   Are you following a low salt diet? Yes  Are your blood pressures ever more than 140 on the top number (systolic) OR more   than 90 on the bottom number (diastolic), for example 140/90? No  Norvasc 10 mg daily.  He denies side effects     The 10-year ASCVD risk score (Stephen CASTILLO, et al., 2019) is: 7.4%    Values used to calculate the score:      Age: 43 years      Sex: Male      Is Non- : Yes      Diabetic: No      Tobacco smoker: No      Systolic Blood Pressure: 142 mmHg      Is BP treated: Yes      HDL Cholesterol: 67 mg/dL      Total Cholesterol: 181 mg/dL          2/27/2024   General Health   How would you rate your overall physical health? Good   Feel stress (tense, anxious, or unable to sleep) Not at all         2/27/2024   Nutrition    Three or more servings of calcium each day? Yes   Diet: Regular (no restrictions)   How many servings of fruit and vegetables per day? (!) 0-1   How many sweetened beverages each day? 0-1         2/27/2024   Exercise   Days per week of moderate/strenous exercise 2 days   Average minutes spent exercising at this level 30 min   (!) EXERCISE CONCERN      2/27/2024   Social Factors   Frequency of gathering with friends or relatives Once a week   Worry food won't last until get money to buy more No   Food not last or not have enough money for food? No   Do you have housing?  Yes   Are you worried about losing your housing? No   Lack of transportation? No   Unable to get utilities (heat,electricity)? No         2/27/2024   Dental   Dentist two times every year? (!) NO       Today's PHQ-2 Score:       2/27/2024     8:43 AM   PHQ-2 ( 1999 [employer])   Q1: Little interest or pleasure in doing things 0   Q2: Feeling down, depressed or hopeless 0   PHQ-2 Score 0   Q1: Little interest or pleasure in doing things Not at all   Q2: Feeling down, depressed or hopeless Not at all   PHQ-2 Score 0           2/27/2024   Substance Use   Alcohol more than 3/day or more than 7/wk No   Do you use any other substances recreationally? No     Social History     Tobacco Use    Smoking status: Never    Smokeless tobacco: Never   Vaping Use    Vaping Use: Never used   Substance Use Topics    Alcohol use: Yes     Comment: occasionally, couple times a year    Drug use: No           2/27/2024   STI Screening   New sexual partner(s) since last STI/HIV test? No   ASCVD Risk   The 10-year ASCVD risk score (Stephen CASTILLO, et al., 2019) is: 7.4%    Values used to calculate the score:      Age: 43 years      Sex: Male      Is Non- : Yes      Diabetic: No      Tobacco smoker: No      Systolic Blood Pressure: 142 mmHg      Is BP treated: Yes      HDL Cholesterol: 67 mg/dL      Total Cholesterol: 181 mg/dL       Reviewed and  "updated as needed this visit by Provider                    BP Readings from Last 3 Encounters:   02/27/24 (!) 142/84   12/12/22 (!) 140/80   12/05/22 (!) 153/78    Wt Readings from Last 3 Encounters:   02/27/24 84.4 kg (186 lb)   12/12/22 82.2 kg (181 lb 3.2 oz)   12/05/22 83.5 kg (184 lb)              Review of Systems  Constitutional, HEENT, cardiovascular, pulmonary, gi and gu systems are negative, except as otherwise noted.     Objective    Exam  BP (!) 142/84 (BP Location: Right arm, Patient Position: Sitting, Cuff Size: Adult Large)   Pulse 79   Temp 98.3  F (36.8  C) (Oral)   Resp 16   Ht 1.702 m (5' 7\")   Wt 84.4 kg (186 lb)   SpO2 100%   BMI 29.13 kg/m     Estimated body mass index is 29.13 kg/m  as calculated from the following:    Height as of this encounter: 1.702 m (5' 7\").    Weight as of this encounter: 84.4 kg (186 lb).    Physical Exam  GENERAL: alert and no distress  EYES: Eyes grossly normal to inspection, PERRL and conjunctivae and sclerae normal  HENT: ear canals and TM's normal, nose and mouth without ulcers or lesions  NECK: no adenopathy, no asymmetry, masses, or scars  RESP: lungs clear to auscultation - no rales, rhonchi or wheezes  CV: regular rate and rhythm, normal S1 S2, no S3 or S4, no murmur, click or rub, no peripheral edema  ABDOMEN: soft, nontender, no hepatosplenomegaly, no masses and bowel sounds normal  MS: Left LE exam reveals weak quad with lateral tracking of the patella, the rest of the knee exam was unremarkable.     SKIN: no suspicious lesions or rashes  NEURO: Normal strength and tone, mentation intact and speech normal  PSYCH: mentation appears normal, affect normal/bright      Signed Electronically by: Lauren Varner DO    "

## 2024-02-27 NOTE — PATIENT INSTRUCTIONS
Preventive Care Advice   This is general advice given by our system to help you stay healthy. However, your care team may have specific advice just for you. Please talk to your care team about your preventive care needs.  Nutrition  Eat 5 or more servings of fruits and vegetables each day.  Try wheat bread, brown rice and whole grain pasta (instead of white bread, rice, and pasta).  Get enough calcium and vitamin D. Check the label on foods and aim for 100% of the RDA (recommended daily allowance).  Lifestyle  Exercise at least 150 minutes each week   (30 minutes a day, 5 days a week).  Do muscle strengthening activities 2 days a week. These help control your weight and prevent disease.  No smoking.  Wear sunscreen to prevent skin cancer.  Have a dental exam and cleaning every 6 months.  Yearly exams  See your health care team every year to talk about:  Any changes in your health.  Any medicines your care team has prescribed.  Preventive care, family planning, and ways to prevent chronic diseases.  Shots (vaccines)   HPV shots (up to age 26), if you've never had them before.  Hepatitis B shots (up to age 59), if you've never had them before.  COVID-19 shot: Get this shot when it's due.  Flu shot: Get a flu shot every year.  Tetanus shot: Get a tetanus shot every 10 years.  Pneumococcal, hepatitis A, and RSV shots: Ask your care team if you need these based on your risk.  Shingles shot (for age 50 and up).  General health tests  Diabetes screening:  Starting at age 35, Get screened for diabetes at least every 3 years.  If you are younger than age 35, ask your care team if you should be screened for diabetes.  Cholesterol test: At age 39, start having a cholesterol test every 5 years, or more often if advised.  Bone density scan (DEXA): At age 50, ask your care team if you should have this scan for osteoporosis (brittle bones).  Hepatitis C: Get tested at least once in your life.  STIs (sexually transmitted  infections)  Before age 24: Ask your care team if you should be screened for STIs.  After age 24: Get screened for STIs if you're at risk. You are at risk for STIs (including HIV) if:  You are sexually active with more than one person.  You don't use condoms every time.  You or a partner was diagnosed with a sexually transmitted infection.  If you are at risk for HIV, ask about PrEP medicine to prevent HIV.  Get tested for HIV at least once in your life, whether you are at risk for HIV or not.  Cancer screening tests  Cervical cancer screening: If you have a cervix, begin getting regular cervical cancer screening tests at age 21. Most people who have regular screenings with normal results can stop after age 65. Talk about this with your provider.  Breast cancer scan (mammogram): If you've ever had breasts, begin having regular mammograms starting at age 40. This is a scan to check for breast cancer.  Colon cancer screening: It is important to start screening for colon cancer at age 45.  Have a colonoscopy test every 10 years (or more often if you're at risk) Or, ask your provider about stool tests like a FIT test every year or Cologuard test every 3 years.  To learn more about your testing options, visit: https://www.[employer]/549417.pdf.  For help making a decision, visit: https://bit.ly/fy52924.  Prostate cancer screening test: If you have a prostate and are age 55 to 69, ask your provider if you would benefit from a yearly prostate cancer screening test.  Lung cancer screening: If you are a current or former smoker age 50 to 80, ask your care team if ongoing lung cancer screenings are right for you.  For informational purposes only. Not to replace the advice of your health care provider. Copyright   2023 [employer]. All rights reserved. Clinically reviewed by the [facility]. [employer] 918071 - REV 01/24.     Learning About Risk for Heart Attack and Stroke (01:56)  Your  health professional recommends that you watch this short online health video.  Learn what raises your risk for having a heart attack or stroke and how you can lower your risk.  Purpose:  Explains risk factors for heart attack and stroke and ways to lower the risk.  Goal:  Users will understand what it means to be at risk for having a heart attack or stroke and what they can do to reduce their risk.     How to watch the video    Scan the QR code   OR Visit the website    [email or web address]/Jhjpzhxhazcpy   Current as of: June 25, 2023               Content Version: 13.8    7535-4201 [employer].   Care instructions adapted under license by your healthcare professional. If you have questions about a medical condition or this instruction, always ask your healthcare professional. [employer] disclaims any warranty or liability for your use of this information.

## 2024-02-28 LAB
ANION GAP SERPL CALCULATED.3IONS-SCNC: 9 MMOL/L (ref 7–15)
BUN SERPL-MCNC: 11.2 MG/DL (ref 6–20)
CALCIUM SERPL-MCNC: 9 MG/DL (ref 8.6–10)
CHLORIDE SERPL-SCNC: 102 MMOL/L (ref 98–107)
CHOLEST SERPL-MCNC: 175 MG/DL
CREAT SERPL-MCNC: 0.96 MG/DL (ref 0.67–1.17)
DEPRECATED HCO3 PLAS-SCNC: 31 MMOL/L (ref 22–29)
EGFRCR SERPLBLD CKD-EPI 2021: >90 ML/MIN/1.73M2
FASTING STATUS PATIENT QL REPORTED: NO
GLUCOSE SERPL-MCNC: 90 MG/DL (ref 70–99)
HDLC SERPL-MCNC: 60 MG/DL
LDLC SERPL CALC-MCNC: 98 MG/DL
NONHDLC SERPL-MCNC: 115 MG/DL
POTASSIUM SERPL-SCNC: 3.4 MMOL/L (ref 3.4–5.3)
SODIUM SERPL-SCNC: 142 MMOL/L (ref 135–145)
TRIGL SERPL-MCNC: 86 MG/DL

## 2024-09-20 ENCOUNTER — OFFICE VISIT (OUTPATIENT)
Dept: FAMILY MEDICINE | Facility: CLINIC | Age: 44
End: 2024-09-20
Payer: COMMERCIAL

## 2024-09-20 VITALS
HEART RATE: 66 BPM | TEMPERATURE: 98.1 F | DIASTOLIC BLOOD PRESSURE: 76 MMHG | OXYGEN SATURATION: 100 % | HEIGHT: 67 IN | WEIGHT: 186 LBS | SYSTOLIC BLOOD PRESSURE: 112 MMHG | BODY MASS INDEX: 29.19 KG/M2 | RESPIRATION RATE: 18 BRPM

## 2024-09-20 DIAGNOSIS — H53.8 BLURRED VISION: ICD-10-CM

## 2024-09-20 DIAGNOSIS — I10 BENIGN ESSENTIAL HYPERTENSION: Primary | ICD-10-CM

## 2024-09-20 DIAGNOSIS — K59.00 CONSTIPATION, UNSPECIFIED CONSTIPATION TYPE: ICD-10-CM

## 2024-09-20 DIAGNOSIS — D50.9 IRON DEFICIENCY ANEMIA, UNSPECIFIED IRON DEFICIENCY ANEMIA TYPE: ICD-10-CM

## 2024-09-20 LAB
ANION GAP SERPL CALCULATED.3IONS-SCNC: 8 MMOL/L (ref 7–15)
BUN SERPL-MCNC: 12.1 MG/DL (ref 6–20)
CALCIUM SERPL-MCNC: 9.3 MG/DL (ref 8.8–10.4)
CHLORIDE SERPL-SCNC: 104 MMOL/L (ref 98–107)
CREAT SERPL-MCNC: 0.95 MG/DL (ref 0.67–1.17)
EGFRCR SERPLBLD CKD-EPI 2021: >90 ML/MIN/1.73M2
ERYTHROCYTE [DISTWIDTH] IN BLOOD BY AUTOMATED COUNT: 13.2 % (ref 10–15)
FERRITIN SERPL-MCNC: 43 NG/ML (ref 31–409)
GLUCOSE SERPL-MCNC: 94 MG/DL (ref 70–99)
HCO3 SERPL-SCNC: 31 MMOL/L (ref 22–29)
HCT VFR BLD AUTO: 40.6 % (ref 40–53)
HGB BLD-MCNC: 13.3 G/DL (ref 13.3–17.7)
MCH RBC QN AUTO: 26.9 PG (ref 26.5–33)
MCHC RBC AUTO-ENTMCNC: 32.8 G/DL (ref 31.5–36.5)
MCV RBC AUTO: 82 FL (ref 78–100)
PLATELET # BLD AUTO: 238 10E3/UL (ref 150–450)
POTASSIUM SERPL-SCNC: 3.4 MMOL/L (ref 3.4–5.3)
RBC # BLD AUTO: 4.94 10E6/UL (ref 4.4–5.9)
SODIUM SERPL-SCNC: 143 MMOL/L (ref 135–145)
TSH SERPL DL<=0.005 MIU/L-ACNC: 0.86 UIU/ML (ref 0.3–4.2)
WBC # BLD AUTO: 5.3 10E3/UL (ref 4–11)

## 2024-09-20 PROCEDURE — 84443 ASSAY THYROID STIM HORMONE: CPT | Performed by: FAMILY MEDICINE

## 2024-09-20 PROCEDURE — 85027 COMPLETE CBC AUTOMATED: CPT | Performed by: FAMILY MEDICINE

## 2024-09-20 PROCEDURE — 80048 BASIC METABOLIC PNL TOTAL CA: CPT | Performed by: FAMILY MEDICINE

## 2024-09-20 PROCEDURE — 99214 OFFICE O/P EST MOD 30 MIN: CPT | Performed by: FAMILY MEDICINE

## 2024-09-20 PROCEDURE — 82728 ASSAY OF FERRITIN: CPT | Performed by: FAMILY MEDICINE

## 2024-09-20 PROCEDURE — 36415 COLL VENOUS BLD VENIPUNCTURE: CPT | Performed by: FAMILY MEDICINE

## 2024-09-20 RX ORDER — AMLODIPINE BESYLATE 10 MG/1
10 TABLET ORAL DAILY
Qty: 90 TABLET | Refills: 3 | Status: SHIPPED | OUTPATIENT
Start: 2024-09-20

## 2024-09-20 NOTE — PROGRESS NOTES
"  Assessment & Plan     Benign essential hypertension  The current medical regimen is effective;  continue present plan and medications.    - Basic metabolic panel  (Ca, Cl, CO2, Creat, Gluc, K, Na, BUN); Future  - amLODIPine (NORVASC) 10 MG tablet; Take 1 tablet (10 mg) by mouth daily. Needs appointment    Blurred vision  Likely needs reading glasses   - Adult Eye  Referral; Future    Iron deficiency anemia, unspecified iron deficiency anemia type  He has a history of erosions of the sigmoid colon.  Will repeat labs today   - CBC with platelets; Future  - Ferritin; Future    Constipation, unspecified constipation type  Could be related to norvasc but will check tsh.  It is mild so will not adjust meds at this time   - TSH with free T4 reflex; Future          BMI  Estimated body mass index is 29.13 kg/m  as calculated from the following:    Height as of this encounter: 1.702 m (5' 7\").    Weight as of this encounter: 84.4 kg (186 lb).         Follow up 6-12 months for HTN    Hung Singh is a 43 year old, presenting for the following health issues:  Hypertension    History of Present Illness       Hypertension: He presents for follow up of hypertension.  He does check blood pressure  regularly outside of the clinic. Outside blood pressures have been over 140/90. He follows a low salt diet.     He eats 0-1 servings of fruits and vegetables daily.He consumes 0 sweetened beverage(s) daily.He exercises with enough effort to increase his heart rate 20 to 29 minutes per day.  He exercises with enough effort to increase his heart rate 3 or less days per week. He is missing 3 dose(s) of medications per week.    Norvasc 10 mg daily    Varies on when he takes his medication but usually in the morning. Took his medication shortly before coming here.  brought his wrist machine to compare with out machine.    The patient had mild anemia February 2024.  He had no signs or symptoms of this.  He was referred for " "colonoscopy.  He has a history of eosions in his sigmoid colon.  He had some rectal bleeding a month ago.  He is trying not to get constipated.       Eye-  Blurry vision. Would like a referral  Something to help him read and for work. It is mostly with reading and computer work but also also bad in the am.  It seems cloudy.          Review of Systems  Constitutional, HEENT, cardiovascular, pulmonary, gi and gu systems are negative, except as otherwise noted.      Objective    /76 (BP Location: Right arm, Cuff Size: Adult Regular)   Pulse 66   Temp 98.1  F (36.7  C) (Oral)   Resp 18   Ht 1.702 m (5' 7\")   Wt 84.4 kg (186 lb)   SpO2 100%   BMI 29.13 kg/m    Body mass index is 29.13 kg/m .  Physical Exam   GENERAL: alert and no distress  HENT: ear canals and TM's normal, nose and mouth without ulcers or lesions  NECK: no adenopathy, no asymmetry, masses, or scars  RESP: lungs clear to auscultation - no rales, rhonchi or wheezes  CV: regular rate and rhythm, normal S1 S2, no S3 or S4, no murmur, click or rub, no peripheral edema  ABDOMEN: soft, nontender, no hepatosplenomegaly, no masses and bowel sounds normal  MS: no gross musculoskeletal defects noted, no edema  PSYCH: mentation appears normal, affect normal/bright    No results found for this or any previous visit (from the past 24 hour(s)).        Signed Electronically by: Lauren Varner DO    "

## 2025-01-07 ENCOUNTER — OFFICE VISIT (OUTPATIENT)
Dept: OPHTHALMOLOGY | Facility: CLINIC | Age: 45
End: 2025-01-07
Attending: FAMILY MEDICINE
Payer: COMMERCIAL

## 2025-01-07 ENCOUNTER — TELEPHONE (OUTPATIENT)
Dept: OPHTHALMOLOGY | Facility: CLINIC | Age: 45
End: 2025-01-07

## 2025-01-07 DIAGNOSIS — H52.4 MYOPIA OF BOTH EYES WITH ASTIGMATISM AND PRESBYOPIA: Primary | ICD-10-CM

## 2025-01-07 DIAGNOSIS — H52.13 MYOPIA OF BOTH EYES WITH ASTIGMATISM AND PRESBYOPIA: Primary | ICD-10-CM

## 2025-01-07 DIAGNOSIS — H11.002 PTERYGIUM, LEFT: ICD-10-CM

## 2025-01-07 DIAGNOSIS — H53.8 BLURRED VISION: ICD-10-CM

## 2025-01-07 DIAGNOSIS — H52.203 MYOPIA OF BOTH EYES WITH ASTIGMATISM AND PRESBYOPIA: Primary | ICD-10-CM

## 2025-01-07 PROCEDURE — 92014 COMPRE OPH EXAM EST PT 1/>: CPT | Performed by: OPHTHALMOLOGY

## 2025-01-07 PROCEDURE — 92015 DETERMINE REFRACTIVE STATE: CPT | Performed by: OPHTHALMOLOGY

## 2025-01-07 ASSESSMENT — VISUAL ACUITY
OS_SC: 20/30
OS_SC+: -2
METHOD: SNELLEN - LINEAR
OD_SC: 20/30

## 2025-01-07 ASSESSMENT — CONF VISUAL FIELD
OD_INFERIOR_TEMPORAL_RESTRICTION: 0
OS_INFERIOR_TEMPORAL_RESTRICTION: 0
OD_SUPERIOR_TEMPORAL_RESTRICTION: 0
OS_SUPERIOR_NASAL_RESTRICTION: 0
OD_SUPERIOR_NASAL_RESTRICTION: 0
OS_INFERIOR_NASAL_RESTRICTION: 0
OS_NORMAL: 1
OD_INFERIOR_NASAL_RESTRICTION: 0
OS_SUPERIOR_TEMPORAL_RESTRICTION: 0
OD_NORMAL: 1

## 2025-01-07 ASSESSMENT — REFRACTION_MANIFEST
OS_AXIS: 089
OD_SPHERE: -0.75
OD_CYLINDER: SPHERE
OS_ADD: +1.75
OD_ADD: +1.75
OS_SPHERE: -1.00
OS_CYLINDER: +0.75

## 2025-01-07 ASSESSMENT — TONOMETRY
OS_IOP_MMHG: 13
OD_IOP_MMHG: 15
IOP_METHOD: ICARE

## 2025-01-07 ASSESSMENT — EXTERNAL EXAM - RIGHT EYE: OD_EXAM: NORMAL

## 2025-01-07 ASSESSMENT — CUP TO DISC RATIO
OD_RATIO: 0.3
OS_RATIO: 0.3

## 2025-01-07 ASSESSMENT — SLIT LAMP EXAM - LIDS
COMMENTS: NORMAL
COMMENTS: NORMAL

## 2025-01-07 ASSESSMENT — EXTERNAL EXAM - LEFT EYE: OS_EXAM: NORMAL

## 2025-01-07 NOTE — TELEPHONE ENCOUNTER
Left Voicemail (1st Attempt) for the patient to call back and schedule the following:    Appointment type: return  Provider: dr. early  Return date: 1/7/2025  Specialty phone number: [phone]   Additonal Notes: Return in about 1 year (around 1/7/2026) for Annual Visit-v/t/d/Devin lee Complex   Orthopedics, Podiatry, Sports Medicine, Ent ,Eye , Audiology, Adult Endocrine & Diabetes, Nutrition & Medication Therapy Management Specialties   [facility]

## 2025-01-07 NOTE — NURSING NOTE
Chief Complaints and History of Present Illnesses   Patient presents with    Annual Eye Exam     Chief Complaint(s) and History of Present Illness(es)       Annual Eye Exam              Laterality: both eyes    Onset: 1 year ago              Comments    Pt. States that he has noticed worsening distance and near vision over the last year. No pain BE. Some dryness BE. No flashes or floaters BE.  Toyin Simon COT 7:19 AM January 7, 2025

## 2025-01-07 NOTE — PROGRESS NOTES
HPI       Annual Eye Exam    In both eyes.  This started 1 year ago.             Comments    Pt. States that he has noticed worsening distance and near vision over the last year. No pain BE. Some dryness BE. No flashes or floaters BE.  Toyin Simon COT 7:19 AM January 7, 2025             Last edited by Toyin Simon on 1/7/2025  7:19 AM.         Review of systems for the eyes was negative other than the pertinent positives/negatives listed in the HPI.      Assessment & Plan    HPI:  Ephraim Rebolledo is a 44 year old male with history of HTN, pterygium removal right eye presents for complete exam. Notes blurred vision both eyes distance and near.       POHx: pterygium removal right eye   PMHx:  Current Medications:   Current Outpatient Medications   Medication Sig Dispense Refill    amLODIPine (NORVASC) 10 MG tablet Take 1 tablet (10 mg) by mouth daily. Needs appointment 90 tablet 3    cyclobenzaprine (FLEXERIL) 10 MG tablet Take 0.5-1 tablets (5-10 mg) by mouth nightly as needed for muscle spasms (neck pain) 20 tablet 1     No current facility-administered medications for this visit.     FHx: son-amblyopia   PSHx: Pterygium Excision/Conjunctival Autograft 5/11/22 Madiha        Current Eye Medications:      Assessment & Plan:  (H53.8) Blurred vision  (H52.13,  H52.203,  H52.4) Myopia of both eyes with astigmatism and presbyopia  (primary encounter diagnosis)  Patient has minimal change in myopia but a copy of today's glasses prescription was given.  The patient may wish to update the glasses if the lenses are scratched or the frames are too small.  Presbyopia is difficulty seeing up close and is treated with bifocals or over the counter reading glasses      (H11.002) Pterygium, left  Recommend excision given best corrected visual acuity 20/25-left eye   Patient interested in excision, although painful in followup  Referral placed, happy to see patient post-op if needed in [place]  Advised that MRx may change  following pterygium excision    Return in about 1 year (around 1/7/2026) for Annual Visit-v/t/d/MRx.        Gregg Clifton MD     Attending Physician Attestation:  Complete documentation of historical and exam elements from today's encounter can be found in the full encounter summary report (not reduplicated in this progress note).  I personally obtained the chief complaint(s) and history of present illness.  I confirmed and edited as necessary the review of systems, past medical/surgical history, family history, social history, and examination findings as documented by others; and I examined the patient myself.  I personally reviewed the relevant tests, images, and reports as documented above.  I formulated and edited as necessary the assessment and plan and discussed the findings and management plan with the patient and family. - Gregg Clifton MD

## 2025-01-20 ENCOUNTER — NURSE TRIAGE (OUTPATIENT)
Dept: FAMILY MEDICINE | Facility: CLINIC | Age: 45
End: 2025-01-20

## 2025-01-20 ENCOUNTER — OFFICE VISIT (OUTPATIENT)
Dept: FAMILY MEDICINE | Facility: CLINIC | Age: 45
End: 2025-01-20
Payer: COMMERCIAL

## 2025-01-20 VITALS
WEIGHT: 181 LBS | HEART RATE: 65 BPM | HEIGHT: 67 IN | DIASTOLIC BLOOD PRESSURE: 70 MMHG | SYSTOLIC BLOOD PRESSURE: 120 MMHG | BODY MASS INDEX: 28.41 KG/M2 | TEMPERATURE: 98.7 F | OXYGEN SATURATION: 100 % | RESPIRATION RATE: 16 BRPM

## 2025-01-20 DIAGNOSIS — H00.11 CHALAZION OF RIGHT UPPER EYELID: Primary | ICD-10-CM

## 2025-01-20 PROCEDURE — 99213 OFFICE O/P EST LOW 20 MIN: CPT | Performed by: FAMILY MEDICINE

## 2025-01-20 RX ORDER — ERYTHROMYCIN 5 MG/G
OINTMENT OPHTHALMIC
Qty: 3.5 G | Refills: 0 | Status: SHIPPED | OUTPATIENT
Start: 2025-01-20 | End: 2025-01-27

## 2025-01-20 ASSESSMENT — PAIN SCALES - GENERAL: PAINLEVEL_OUTOF10: MODERATE PAIN (5)

## 2025-01-20 ASSESSMENT — ENCOUNTER SYMPTOMS: EYE PAIN: 1

## 2025-01-20 NOTE — LETTER
2025    Ephraim Rebolledo   1980        To Whom it May Concern;    Please excuse Billdanicabriseyda Connernecheco from work/school for a healthcare visit on 2025.  He needs to miss work 2025.    Sincerely,          Lauren Varner, DO

## 2025-01-20 NOTE — PROGRESS NOTES
"  Assessment & Plan     Chalazion of right upper eyelid    We also discussed taking oral fish oil tabs in addition to the erythromycin ointment.  He will use warm compresses several times a day    - erythromycin (ROMYCIN) 5 MG/GM ophthalmic ointment; Apply 1 cm ribbon in affected eye(s) four times a day.      Follow-up if not improving    Subjective   Francisco is a 44 year old, presenting for the following health issues:  Eye Problem and Conjunctivitis        1/20/2025    12:54 PM   Additional Questions   Roomed by Fidelia KUMARI   Accompanied by self         1/20/2025    12:54 PM   Patient Reported Additional Medications   Patient reports taking the following new medications none     History of Present Illness       Reason for visit:  Eye lid swelling and pain  Symptom onset:  3-7 days ago  Symptoms include:  Pain,swelling and itchin  Symptom intensity:  Moderate  Symptom progression:  Worsening  Had these symptoms before:  No  What makes it worse:  Touch and movement  What makes it better:  Nothing yet   He is taking medications regularly.      Eye(s) Problem  Onset/Duration: Day 3   Description:   Location: Right   Pain: YES  Redness: No  Accompanying Signs & Symptoms:  Discharge/mattering: YES-  watery  Swelling: YES  Visual changes: YES  Fever: No  Nasal Congestion: No  Bothered by bright lights: No  History:  Trauma: No  Foreign body exposure: No  Wearing contacts: No  Precipitating or alleviating factors: None  Therapies tried and outcome:  OTC drops           Review of Systems  Constitutional, HEENT, cardiovascular, pulmonary, gi and gu systems are negative, except as otherwise noted.      Objective    /70   Pulse 65   Temp 98.7  F (37.1  C) (Tympanic)   Resp 16   Ht 1.702 m (5' 7\")   Wt 82.1 kg (181 lb)   SpO2 100%   BMI 28.35 kg/m    Body mass index is 28.35 kg/m .  Physical Exam   GENERAL: alert and no distress  EYES: PERRL, EOMI, conjunctivae and sclerae normal, and eyelids- chalazion right upper " lid  PSYCH: mentation appears normal, affect normal/bright            Signed Electronically by: Lauren Varner, DO

## 2025-01-20 NOTE — PATIENT INSTRUCTIONS
Thank you for choosing us for your care. I have placed an order for a prescription so that you can start treatment. View your full visit summary for details by clicking on the link below. Your pharmacist will able to address any questions you may have about the medication.     If you re not feeling better within 2-3 days, please schedule an appointment.  You can schedule an appointment right here in [facility], or call [phone]  If the visit is for the same symptoms as your eVisit, we ll refund the cost of your eVisit if seen within seven days.

## 2025-01-20 NOTE — TELEPHONE ENCOUNTER
Okay to wait until appointment  later to day.  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     Lauren Varner D.O.

## 2025-01-20 NOTE — TELEPHONE ENCOUNTER
RN called patient/family and relayed provider's message. Patient/family verbalized understanding.     Christy Lawson RN, BSN  [facility]: [place]

## 2025-01-20 NOTE — TELEPHONE ENCOUNTER
"Disposition is to go to office. Second level triage required.  Scheduled a visit with Dr. Varner today at 1pm.    Reason for Disposition   SEVERE eyelid swelling on one side that is red and painful (or tender to touch)    Answer Assessment - Initial Assessment Questions  1. ONSET: \"When did the swelling start?\" (e.g., minutes, hours, days)      Started swelling 3 days ago, worse yesterday  2. LOCATION: \"What part of the eyelid is swollen?\"      Right upper and lower eyelid is swollen, upper is more swollen  3. SEVERITY: \"How swollen is it?\" (e.g., describe; mild, moderate, or severe)      Describes swelling 6-7, obstructing visions  4. ITCHING: \"Is there any itching?\" If Yes, ask: \"How much?\"   (Scale 1-10; mild, moderate or severe)      Yes, rates 3  5. PAIN: \"Is the swelling painful to touch?\" If Yes, ask: \"How painful is it?\"   (Scale 1-10; mild, moderate or severe)      5  6. FEVER: \"Do you have a fever?\" If Yes, ask: \"What is it, how was it measured, and when did it start?\"       No  7. CAUSE: \"What do you think is causing the swelling?\"      No, just woke up with this. No pets in the home  8. RECURRENT SYMPTOM: \"Have you had eyelid swelling before?\" If Yes, ask: \"When was the last time?\" \"What happened that time?\"      No  9. OTHER SYMPTOMS: \"Do you have any other symptoms?\" (e.g., blurred vision, eye discharge, rash, runny nose)      Adan  10. PREGNANCY: \"Is there any chance you are pregnant?\" \"When was your last menstrual period?\"        No    Protocols used: Eyelid Swelling-A-OH    Christy Borges RN on 1/20/2025 at 8:37 AM    "

## 2025-02-28 ENCOUNTER — TELEPHONE (OUTPATIENT)
Dept: FAMILY MEDICINE | Facility: CLINIC | Age: 45
End: 2025-02-28

## 2025-02-28 NOTE — TELEPHONE ENCOUNTER
Prior Authorization Retail Medication Request    Medication/Dose: Amlodapine liquid neeeds pa (tablets preferred)  Diagnosis and ICD code (if different than what is on RX):  same  New/renewal/insurance change PA/secondary ins. PA:  Previously Tried and Failed:  unknown  Rationale:  same    Insurance   Primary: [employer]  Insurance ID:  [id number]    Secondary (if applicable):  Insurance ID:      Pharmacy Information (if different than what is on RX)  Name:  same  Phone:  same  Fax:same    Clinic Information  Preferred routing pool for dept communication:

## 2025-03-04 DIAGNOSIS — I10 BENIGN ESSENTIAL HYPERTENSION: ICD-10-CM

## 2025-03-04 NOTE — TELEPHONE ENCOUNTER
Retail Pharmacy Prior Authorization Team   Phone: [phone]    PRIOR AUTHORIZATION DENIED    Medication: NORLIQVA 1 MG/ML PO SOLN  Insurance Company: [email or web address] - Phone [phone] Fax [phone]  Denial Date: 3/4/2025  Denial Reason(s): DRUG IS EXCLUDED FROM COVERAGE  Appeal Information: EXCLUSIONS ARE NOT ELIGIBLE FOR PA OR APPEALS

## 2025-03-04 NOTE — TELEPHONE ENCOUNTER
Please contact the patient and let him know his insurance does not want to pay for the liquid amlodipine.  I suggest he check it is without insurance.  As it is an older medication it may not be expensive.  He can also try and get the prescription from [facility].    Lauren Varner DO

## 2025-03-06 NOTE — TELEPHONE ENCOUNTER
Left message on answering machine for patient to call back. Relay message below to patient if he does return call.    Fidelia Montero MA

## 2025-03-10 NOTE — TELEPHONE ENCOUNTER
Spoke with patient and he is currently out of town. He will call or mychart us back when he is in town to figure it out      Fidelia Montero MA

## 2025-06-13 DIAGNOSIS — I10 BENIGN ESSENTIAL HYPERTENSION: ICD-10-CM

## 2025-06-17 ENCOUNTER — TELEPHONE (OUTPATIENT)
Dept: OPHTHALMOLOGY | Facility: CLINIC | Age: 45
End: 2025-06-17
Payer: COMMERCIAL

## 2025-06-17 NOTE — TELEPHONE ENCOUNTER
Left Voicemail (2nd Attempt) for the patient to call back and schedule the following:    Appointment type: return  Provider: dr. melo  Return date: 1/7/2026   Specialty phone number: [phone]  Additonal Notes: Return in about 1 year (around 1/7/2026) for Annual Visit-v/t/d/Devin lee Complex   Orthopedics, Podiatry, Sports Medicine, Ent ,Eye , Audiology, Adult Endocrine & Diabetes, Nutrition & Medication Therapy Management Specialties   [facility]

## 2025-07-31 ENCOUNTER — TELEPHONE (OUTPATIENT)
Dept: FAMILY MEDICINE | Facility: CLINIC | Age: 45
End: 2025-07-31
Payer: COMMERCIAL

## 2025-07-31 DIAGNOSIS — I10 BENIGN ESSENTIAL HYPERTENSION: ICD-10-CM

## 2025-07-31 RX ORDER — AMLODIPINE BESYLATE 5 MG/1
5 TABLET ORAL DAILY
Qty: 90 TABLET | Refills: 1 | Status: SHIPPED | OUTPATIENT
Start: 2025-07-31

## 2025-07-31 NOTE — TELEPHONE ENCOUNTER
Routing to PCP    Patient calling    The pharmacy no longer carries his amlodipine liquid form, so he is wondering if he can get it changed back to the tablet form. Willing to send tablet for for patient?    KATIA [place] Triage RN  [facility]

## (undated) DEVICE — GOWN IMPERVIOUS 2XL BLUE

## (undated) DEVICE — LINEN TOWEL PACK X5 5464

## (undated) DEVICE — SOL WATER IRRIG 1000ML BOTTLE 2F7114

## (undated) DEVICE — PACK CATARACT CUSTOM ASC SEY15CPUMC

## (undated) DEVICE — GLOVE PROTEXIS MICRO 8.5  2D73PM85

## (undated) DEVICE — SOL WATER IRRIG 500ML BOTTLE 2F7113

## (undated) DEVICE — NDL 30GA 0.5" 305106

## (undated) DEVICE — EYE CANN IRR 27GA ANTERIOR CHAMBER 581280

## (undated) DEVICE — EYE TIP BIPOLAR 18GA ERASER 221250

## (undated) DEVICE — EYE SPONGE SPEAR WECK CEL 0008685

## (undated) DEVICE — SPECIMEN CONTAINER 3OZ W/FORMALIN 59901

## (undated) DEVICE — EYE SHIELD PLASTIC

## (undated) DEVICE — SUCTION MANIFOLD NEPTUNE 2 SYS 1 PORT 702-025-000

## (undated) DEVICE — EYE PREP BETADINE 5% SOLUTION 30ML 0065-0411-30

## (undated) DEVICE — ENDO FORCEP BX CAPTURA PRO SPIKE G50696

## (undated) DEVICE — APPLICATORS COTTON TIP 6"X2 STERILE LF C15053-006

## (undated) DEVICE — TUBING SUCTION 12"X1/4" N612

## (undated) DEVICE — DRAPE STERI APERATURE 51X33" 1030

## (undated) DEVICE — ESU CORD BIPOLAR 30-1538

## (undated) DEVICE — DRAPE STOCKINETTE 4" 8544

## (undated) DEVICE — KIT ENDO TURNOVER/PROCEDURE CARRY-ON 101822

## (undated) RX ORDER — FENTANYL CITRATE 50 UG/ML
INJECTION, SOLUTION INTRAMUSCULAR; INTRAVENOUS
Status: DISPENSED
Start: 2022-05-11

## (undated) RX ORDER — DIPHENHYDRAMINE HYDROCHLORIDE 50 MG/ML
INJECTION INTRAMUSCULAR; INTRAVENOUS
Status: DISPENSED
Start: 2020-10-01

## (undated) RX ORDER — TETRACAINE HYDROCHLORIDE 5 MG/ML
SOLUTION OPHTHALMIC
Status: DISPENSED
Start: 2022-05-11

## (undated) RX ORDER — ONDANSETRON 2 MG/ML
INJECTION INTRAMUSCULAR; INTRAVENOUS
Status: DISPENSED
Start: 2022-05-11

## (undated) RX ORDER — FENTANYL CITRATE 50 UG/ML
INJECTION, SOLUTION INTRAMUSCULAR; INTRAVENOUS
Status: DISPENSED
Start: 2020-10-01